# Patient Record
Sex: MALE | Race: AMERICAN INDIAN OR ALASKA NATIVE | NOT HISPANIC OR LATINO | Employment: FULL TIME | URBAN - METROPOLITAN AREA
[De-identification: names, ages, dates, MRNs, and addresses within clinical notes are randomized per-mention and may not be internally consistent; named-entity substitution may affect disease eponyms.]

---

## 2023-03-15 ENCOUNTER — OFFICE VISIT (OUTPATIENT)
Dept: NEPHROLOGY | Facility: MEDICAL CENTER | Age: 58
End: 2023-03-15
Payer: COMMERCIAL

## 2023-03-15 VITALS
OXYGEN SATURATION: 99 % | DIASTOLIC BLOOD PRESSURE: 70 MMHG | BODY MASS INDEX: 29.66 KG/M2 | WEIGHT: 178 LBS | HEART RATE: 72 BPM | HEIGHT: 65 IN | SYSTOLIC BLOOD PRESSURE: 122 MMHG | TEMPERATURE: 98.7 F

## 2023-03-15 DIAGNOSIS — N18.30 TYPE 2 DIABETES MELLITUS WITH STAGE 3 CHRONIC KIDNEY DISEASE, WITH LONG-TERM CURRENT USE OF INSULIN, UNSPECIFIED WHETHER STAGE 3A OR 3B CKD (HCC): ICD-10-CM

## 2023-03-15 DIAGNOSIS — Z79.4 TYPE 2 DIABETES MELLITUS WITH STAGE 3 CHRONIC KIDNEY DISEASE, WITH LONG-TERM CURRENT USE OF INSULIN, UNSPECIFIED WHETHER STAGE 3A OR 3B CKD (HCC): ICD-10-CM

## 2023-03-15 DIAGNOSIS — I10 PRIMARY HYPERTENSION: ICD-10-CM

## 2023-03-15 DIAGNOSIS — E11.22 TYPE 2 DIABETES MELLITUS WITH STAGE 3 CHRONIC KIDNEY DISEASE, WITH LONG-TERM CURRENT USE OF INSULIN, UNSPECIFIED WHETHER STAGE 3A OR 3B CKD (HCC): ICD-10-CM

## 2023-03-15 DIAGNOSIS — N18.30 STAGE 3 CHRONIC KIDNEY DISEASE, UNSPECIFIED WHETHER STAGE 3A OR 3B CKD: ICD-10-CM

## 2023-03-15 PROCEDURE — 99204 OFFICE O/P NEW MOD 45 MIN: CPT | Performed by: INTERNAL MEDICINE

## 2023-03-15 RX ORDER — BACLOFEN 10 MG/1
10 TABLET ORAL 3 TIMES DAILY
COMMUNITY

## 2023-03-15 RX ORDER — LISINOPRIL 20 MG/1
40 TABLET ORAL DAILY
COMMUNITY

## 2023-03-15 RX ORDER — HYDROCHLOROTHIAZIDE 25 MG/1
25 TABLET ORAL DAILY
COMMUNITY

## 2023-03-15 RX ORDER — INSULIN GLARGINE 100 [IU]/ML
20 INJECTION, SOLUTION SUBCUTANEOUS EVERY EVENING
COMMUNITY

## 2023-03-15 RX ORDER — CHOLECALCIFEROL (VITAMIN D3) 125 MCG
500 CAPSULE ORAL DAILY
COMMUNITY

## 2023-03-15 RX ORDER — ATORVASTATIN CALCIUM 10 MG/1
10 TABLET, FILM COATED ORAL NIGHTLY
COMMUNITY

## 2023-03-15 RX ORDER — GLIMEPIRIDE 2 MG/1
2 TABLET ORAL EVERY MORNING
COMMUNITY

## 2023-03-15 RX ORDER — PIOGLITAZONEHYDROCHLORIDE 15 MG/1
15 TABLET ORAL DAILY
COMMUNITY

## 2023-03-15 RX ORDER — TERBINAFINE HYDROCHLORIDE 250 MG/1
250 TABLET ORAL DAILY
COMMUNITY

## 2023-03-15 ASSESSMENT — ENCOUNTER SYMPTOMS
SHORTNESS OF BREATH: 0
CHILLS: 0
NAUSEA: 0
FEVER: 0
HYPERTENSION: 1
VOMITING: 0
COUGH: 0

## 2023-03-15 NOTE — PROGRESS NOTES
"Subjective     Juan Almaguer is a 57 y.o. male who presents with Chronic Kidney Disease and Hypertension            Patient has a long history of diabetes, chronic kidney disease with recent creatinine from December 2022 at 1.5 mg/dL.    Chronic Kidney Disease  This is a chronic problem. The current episode started more than 1 year ago. The problem occurs constantly. The problem has been gradually worsening. Pertinent negatives include no chest pain, chills, coughing, fever, nausea, urinary symptoms or vomiting.   Hypertension  This is a chronic problem. The current episode started more than 1 year ago. The problem is unchanged. The problem is controlled. Pertinent negatives include no chest pain, malaise/fatigue, peripheral edema or shortness of breath. Risk factors for coronary artery disease include diabetes mellitus and male gender. Past treatments include ACE inhibitors and diuretics. The current treatment provides significant improvement. There are no compliance problems.  Hypertensive end-organ damage includes kidney disease. Identifiable causes of hypertension include chronic renal disease.     Review of Systems   Constitutional:  Negative for chills, fever and malaise/fatigue.   Respiratory:  Negative for cough and shortness of breath.    Cardiovascular:  Negative for chest pain and leg swelling.   Gastrointestinal:  Negative for nausea and vomiting.   Genitourinary:  Negative for dysuria, frequency and urgency.   All other systems reviewed and are negative.           Objective     /70 (BP Location: Right arm, Patient Position: Sitting, BP Cuff Size: Adult)   Pulse 72   Temp 37.1 °C (98.7 °F) (Temporal)   Ht 1.651 m (5' 5\")   Wt 80.7 kg (178 lb)   SpO2 99%   BMI 29.62 kg/m²      Physical Exam  Vitals and nursing note reviewed.   Constitutional:       General: He is awake.      Appearance: He is not ill-appearing.   HENT:      Head: Normocephalic and atraumatic.      Right Ear: External " ear normal.      Left Ear: External ear normal.      Nose: Nose normal.      Mouth/Throat:      Pharynx: No oropharyngeal exudate or posterior oropharyngeal erythema.   Eyes:      General:         Right eye: No discharge.         Left eye: No discharge.      Conjunctiva/sclera: Conjunctivae normal.   Cardiovascular:      Rate and Rhythm: Normal rate and regular rhythm.   Pulmonary:      Effort: Pulmonary effort is normal. No respiratory distress.      Breath sounds: Normal breath sounds. No wheezing.   Abdominal:      General: Abdomen is flat. Bowel sounds are normal.   Musculoskeletal:         General: No tenderness.      Cervical back: No rigidity. No muscular tenderness.      Right lower leg: No edema.      Left lower leg: No edema.   Skin:     General: Skin is warm and dry.      Coloration: Skin is not jaundiced.      Findings: No lesion or rash.   Neurological:      General: No focal deficit present.      Mental Status: He is alert and oriented to person, place, and time. Mental status is at baseline.   Psychiatric:         Mood and Affect: Mood normal.         Behavior: Behavior normal.         Thought Content: Thought content normal.     Recent lab from December 2022 reviewed showed creatinine 1.5 mg/dL                      Assessment & Plan        1. Primary hypertension  Controlled  Continue same medication regimen  Continue low-sodium diet      2. Stage 3 chronic kidney disease, unspecified whether stage 3a or 3b CKD (HCC)  Etiology is most likely diabetic nephropathy recheck labs  No uremic symptoms  Renal dose of medication  Avoid nephrotoxins  Continue same medication regimen  Patient was advised to call us if symptoms worsen      3. Type 2 diabetes mellitus with stage 3 chronic kidney disease, with long-term current use of insulin, unspecified whether stage 3a or 3b CKD (HCC)  Continue to optimize diabetes control

## 2023-05-09 ENCOUNTER — HOSPITAL ENCOUNTER (EMERGENCY)
Facility: MEDICAL CENTER | Age: 58
End: 2023-05-09
Attending: EMERGENCY MEDICINE
Payer: COMMERCIAL

## 2023-05-09 VITALS
TEMPERATURE: 97.5 F | SYSTOLIC BLOOD PRESSURE: 151 MMHG | OXYGEN SATURATION: 96 % | RESPIRATION RATE: 14 BRPM | WEIGHT: 176.81 LBS | DIASTOLIC BLOOD PRESSURE: 71 MMHG | HEART RATE: 65 BPM | BODY MASS INDEX: 29.46 KG/M2 | HEIGHT: 65 IN

## 2023-05-09 DIAGNOSIS — E11.65 POORLY CONTROLLED TYPE 2 DIABETES MELLITUS (HCC): ICD-10-CM

## 2023-05-09 LAB
ALBUMIN SERPL BCP-MCNC: 3.8 G/DL (ref 3.2–4.9)
ALBUMIN/GLOB SERPL: 1.1 G/DL
ALP SERPL-CCNC: 112 U/L (ref 30–99)
ALT SERPL-CCNC: 18 U/L (ref 2–50)
ANION GAP SERPL CALC-SCNC: 12 MMOL/L (ref 7–16)
AST SERPL-CCNC: 24 U/L (ref 12–45)
BASOPHILS # BLD AUTO: 0.8 % (ref 0–1.8)
BASOPHILS # BLD: 0.04 K/UL (ref 0–0.12)
BILIRUB SERPL-MCNC: 0.5 MG/DL (ref 0.1–1.5)
BUN SERPL-MCNC: 26 MG/DL (ref 8–22)
CALCIUM ALBUM COR SERPL-MCNC: 8.9 MG/DL (ref 8.5–10.5)
CALCIUM SERPL-MCNC: 8.7 MG/DL (ref 8.5–10.5)
CHLORIDE SERPL-SCNC: 104 MMOL/L (ref 96–112)
CO2 SERPL-SCNC: 21 MMOL/L (ref 20–33)
CREAT SERPL-MCNC: 1.5 MG/DL (ref 0.5–1.4)
EOSINOPHIL # BLD AUTO: 0.06 K/UL (ref 0–0.51)
EOSINOPHIL NFR BLD: 1.1 % (ref 0–6.9)
ERYTHROCYTE [DISTWIDTH] IN BLOOD BY AUTOMATED COUNT: 41.8 FL (ref 35.9–50)
GFR SERPLBLD CREATININE-BSD FMLA CKD-EPI: 53 ML/MIN/1.73 M 2
GLOBULIN SER CALC-MCNC: 3.4 G/DL (ref 1.9–3.5)
GLUCOSE BLD STRIP.AUTO-MCNC: 249 MG/DL (ref 65–99)
GLUCOSE SERPL-MCNC: 343 MG/DL (ref 65–99)
HCT VFR BLD AUTO: 39.6 % (ref 42–52)
HGB BLD-MCNC: 13.8 G/DL (ref 14–18)
IMM GRANULOCYTES # BLD AUTO: 0.03 K/UL (ref 0–0.11)
IMM GRANULOCYTES NFR BLD AUTO: 0.6 % (ref 0–0.9)
LYMPHOCYTES # BLD AUTO: 1.36 K/UL (ref 1–4.8)
LYMPHOCYTES NFR BLD: 26.1 % (ref 22–41)
MCH RBC QN AUTO: 30.1 PG (ref 27–33)
MCHC RBC AUTO-ENTMCNC: 34.8 G/DL (ref 33.7–35.3)
MCV RBC AUTO: 86.3 FL (ref 81.4–97.8)
MONOCYTES # BLD AUTO: 0.51 K/UL (ref 0–0.85)
MONOCYTES NFR BLD AUTO: 9.8 % (ref 0–13.4)
NEUTROPHILS # BLD AUTO: 3.22 K/UL (ref 1.82–7.42)
NEUTROPHILS NFR BLD: 61.6 % (ref 44–72)
NRBC # BLD AUTO: 0 K/UL
NRBC BLD-RTO: 0 /100 WBC
PLATELET # BLD AUTO: 291 K/UL (ref 164–446)
PMV BLD AUTO: 10.9 FL (ref 9–12.9)
POTASSIUM SERPL-SCNC: 4.8 MMOL/L (ref 3.6–5.5)
PROT SERPL-MCNC: 7.2 G/DL (ref 6–8.2)
RBC # BLD AUTO: 4.59 M/UL (ref 4.7–6.1)
SODIUM SERPL-SCNC: 137 MMOL/L (ref 135–145)
WBC # BLD AUTO: 5.2 K/UL (ref 4.8–10.8)

## 2023-05-09 PROCEDURE — 99284 EMERGENCY DEPT VISIT MOD MDM: CPT

## 2023-05-09 PROCEDURE — 80053 COMPREHEN METABOLIC PANEL: CPT

## 2023-05-09 PROCEDURE — 82962 GLUCOSE BLOOD TEST: CPT

## 2023-05-09 PROCEDURE — 700105 HCHG RX REV CODE 258: Performed by: EMERGENCY MEDICINE

## 2023-05-09 PROCEDURE — 36415 COLL VENOUS BLD VENIPUNCTURE: CPT

## 2023-05-09 PROCEDURE — 85025 COMPLETE CBC W/AUTO DIFF WBC: CPT

## 2023-05-09 RX ORDER — SODIUM CHLORIDE, SODIUM LACTATE, POTASSIUM CHLORIDE, CALCIUM CHLORIDE 600; 310; 30; 20 MG/100ML; MG/100ML; MG/100ML; MG/100ML
1000 INJECTION, SOLUTION INTRAVENOUS ONCE
Status: COMPLETED | OUTPATIENT
Start: 2023-05-09 | End: 2023-05-09

## 2023-05-09 RX ADMIN — SODIUM CHLORIDE, POTASSIUM CHLORIDE, SODIUM LACTATE AND CALCIUM CHLORIDE 1000 ML: 600; 310; 30; 20 INJECTION, SOLUTION INTRAVENOUS at 08:28

## 2023-05-09 NOTE — ED TRIAGE NOTES
"Chief Complaint   Patient presents with    High Blood Sugar     Pt states that for the last couple of weeks his head and neck have become numb intermittently. Pt also complains of blurry vision and headaches. Pt states he has a history of diabetes and HTN. Pt states that his blood glucose was 340 this morning. Pt states that he takes Lantus daily.      BP (!) 179/83   Pulse 86   Temp 36.3 °C (97.3 °F) (Temporal)   Resp 14   Ht 1.651 m (5' 5\")   Wt 80.2 kg (176 lb 12.9 oz)   SpO2 96%   BMI 29.42 kg/m²     Pt is ambulatory in and out of triage. Appropriate PPE worn throughout entire encounter. Pt placed back in the lobby and educated about triage process.    "

## 2023-05-09 NOTE — ED NOTES
Pt given discharge instructions/home care instructions explained, pt verbalized understanding of instructions given/pt understands the importance of follow up, pt ambulatory to ER rena.

## 2023-05-09 NOTE — ED PROVIDER NOTES
ED Provider Note    CHIEF COMPLAINT  Chief Complaint   Patient presents with    High Blood Sugar     Pt states that for the last couple of weeks his head and neck have become numb intermittently. Pt also complains of blurry vision and headaches. Pt states he has a history of diabetes and HTN. Pt states that his blood glucose was 340 this morning. Pt states that he takes Lantus daily.        EXTERNAL RECORDS REVIEWED  Outpatient Notes seen by nephrology in March 2023 for first visit.  Thought to have diabetic nephropathy.  Creatinine of 1.5 as recently as December 2022 no other past medical history for review.    HPI/ROS  LIMITATION TO HISTORY   Select: : None  OUTSIDE HISTORIAN(S):       Juan Almaguer is a 57 y.o. male who presents with numbness to his face and intermittent headaches.  He states that his numbness is throughout his head.  Does not have a significant headache at this time however.  Chief complaint is numbness however.  Has been ongoing for the past few weeks.    Patient has a known history of diabetes and hypertension.  He states that he takes Lantus nightly along with 2 oral agents for diabetes management.  He is also status post take a weekly injection, I suspect Trulicity, though states that he has having difficulty remembering to take this so he has stopped over the past year or so.  He last saw his primary care provider at the [facility] about a month and a half ago.    Blood sugar was 150 last night.  States that he woke up and it was in the 300s.  He is having difficulty maintaining euglycemia.    He states that he has had polyuria though denies polydipsia.  Feels like he has a dry mouth.  He also has a known history of peripheral neuropathy to his hands and feet.    No weakness to his arms or legs.  No difficulty with speaking.  No history of trauma.  No new medications.    PAST MEDICAL HISTORY       SURGICAL HISTORY  patient denies any surgical history    FAMILY  "HISTORY  History reviewed. No pertinent family history.    SOCIAL HISTORY  Social History     Tobacco Use    Smoking status: Never    Smokeless tobacco: Never   Vaping Use    Vaping Use: Never used   Substance and Sexual Activity    Alcohol use: Not Currently    Drug use: Not Currently    Sexual activity: Not on file       CURRENT MEDICATIONS  Home Medications       Reviewed by Cristy Gill R.N. (Registered Nurse) on 05/09/23 at 0701  Med List Status: Not Addressed     Medication Last Dose Status   atorvastatin (LIPITOR) 10 MG Tab  Active   baclofen (LIORESAL) 10 MG Tab  Active   cyanocobalamin (VITAMIN B-12) 500 MCG Tab  Active   glimepiride (AMARYL) 2 MG Tab  Active   hydroCHLOROthiazide (HYDRODIURIL) 25 MG Tab  Active   insulin glargine (LANTUS) 100 UNIT/ML SC SOLN  Active   lisinopril (PRINIVIL) 20 MG Tab  Active   pioglitazone (ACTOS) 15 MG Tab  Active   terbinafine (LAMISIL) 250 MG Tab  Active                    ALLERGIES  Allergies   Allergen Reactions    Penicillins Itching       PHYSICAL EXAM  VITAL SIGNS: BP (!) 179/83   Pulse 86   Temp 36.3 °C (97.3 °F) (Temporal)   Resp 14   Ht 1.651 m (5' 5\")   Wt 80.2 kg (176 lb 12.9 oz)   SpO2 96%   BMI 29.42 kg/m²    Constitutional: Alert in no apparent distress.  HENT: No signs of trauma, Bilateral external ears normal, Nose normal.   Eyes: Pupils are equal and reactive, Conjunctiva normal, Non-icteric.   Neck: Normal range of motion, No tenderness, Supple, No stridor.   Cardiovascular: Regular rate and rhythm.   Thorax & Lungs: Normal breath sounds, No respiratory distress, No wheezing  Abdomen: Soft, No tenderness, No peritoneal signs, No masses.   Skin: Warm, Dry, No erythema, No rash.   Back: No bony tenderness, No CVA tenderness.   Extremities: Intact distal pulses, No edema, No tenderness, No cyanosis  Musculoskeletal: Good range of motion in all major joints. No major deformities noted.   Neurologic: Alert, Normal motor function, Normal sensory " function, No focal deficits noted.   Psychiatric: Affect normal, Judgment normal, Mood normal.       DIAGNOSTIC STUDIES / PROCEDURES  EKG  I have independently interpreted this EKG  No results found for this or any previous visit.    LABS  Labs Reviewed   CBC WITH DIFFERENTIAL - Abnormal; Notable for the following components:       Result Value    RBC 4.59 (*)     Hemoglobin 13.8 (*)     Hematocrit 39.6 (*)     All other components within normal limits   COMP METABOLIC PANEL - Abnormal; Notable for the following components:    Glucose 343 (*)     Bun 26 (*)     Creatinine 1.50 (*)     Alkaline Phosphatase 112 (*)     All other components within normal limits   ESTIMATED GFR - Abnormal; Notable for the following components:    GFR (CKD-EPI) 53 (*)     All other components within normal limits   CORRECTED CALCIUM       COURSE & MEDICAL DECISION MAKING    ED Observation Status? No; Patient does not meet criteria for ED Observation.     INITIAL ASSESSMENT, COURSE AND PLAN  Care Narrative: 57 y.o. M with a known history of diabetes presenting with numbness to his face.  Has longstanding prior numbness to bilateral upper and lower extremities consistent with neuropathy.  Denies any difficulty with speech, vision, strength in his upper or lower extremities.  It appears to involve his entire face.  It comes and goes.  Also has some occasional blurry vision and headaches.  He has had his sugars have been running a little high recently in the 300s.  He has not been compliant with his weekly injection dosing of diabetes medication.  I suspect it may be Trulicity.    Upon review of patient's primary record, his baseline creatinine is 1.5.  Consistent with today's creatinine.  Glucose is elevated at 343.  No evidence of DKA.  Patient was given IV fluid hydration for hyperglycemia.  Upon reevaluation, he is resting comfortably.  States that he feels better overall.  Given the distribution of the patient's numbness involving his  entire face and only intermittently, does not appear to be consistent with strokelike symptoms.  No motor deficits.  No difficulty with speech or vision.  No facial droop.    Symptoms are most consistent with a diabetic neuropathy.  After IV hydration, hypercapnia did improve.  Recommend follow-up with primary care for further management and for stricter glucose management long-term.  Importance of long-term management and potential for very undesirable sequelae of uncontrolled diabetes discussed with patient.    HYDRATION: Based on the patient's presentation of Hyperglycemia the patient was given IV fluids. IV Hydration was used because oral hydration was not as rapid as required. Upon recheck following hydration, the patient was improved.  HTN/IDDM FOLLOW UP:  The patient is referred to a primary physician for blood pressure management, diabetic screening, and for all other preventive health concerns      ADDITIONAL PROBLEM LIST  Medication noncompliance    DISPOSITION AND DISCUSSIONS  I have discussed management of the patient with the following physicians and SID's:      Discussion of management with other QHP or appropriate source(s): None     Escalation of care considered, and ultimately not performed:IV fluids and diagnostic imaging    Barriers to care at this time, including but not limited to:    .     Decision tools and prescription drugs considered including, but not limited to:    .    FINAL DIAGNOSIS  1. Poorly controlled type 2 diabetes mellitus (HCC)           Electronically signed by: Fidel Lewis M.D., 5/9/2023 7:59 AM

## 2023-06-27 ENCOUNTER — TELEPHONE (OUTPATIENT)
Dept: HEALTH INFORMATION MANAGEMENT | Facility: OTHER | Age: 58
End: 2023-06-27
Payer: COMMERCIAL

## 2023-08-09 ENCOUNTER — TELEPHONE (OUTPATIENT)
Dept: CARDIOLOGY | Facility: MEDICAL CENTER | Age: 58
End: 2023-08-09
Payer: COMMERCIAL

## 2023-08-09 NOTE — TELEPHONE ENCOUNTER
Called patient in regards to records for NP appointment with Dr. Borrero To review most recent lab results, ekg, any cardiac testing or ,if patient has been treated by a cardiologist. Bad phone number, unable to LVM.

## 2023-08-30 ENCOUNTER — OFFICE VISIT (OUTPATIENT)
Dept: CARDIOLOGY | Facility: MEDICAL CENTER | Age: 58
End: 2023-08-30
Attending: INTERNAL MEDICINE
Payer: COMMERCIAL

## 2023-08-30 VITALS
WEIGHT: 184 LBS | OXYGEN SATURATION: 95 % | HEIGHT: 65 IN | SYSTOLIC BLOOD PRESSURE: 164 MMHG | DIASTOLIC BLOOD PRESSURE: 88 MMHG | RESPIRATION RATE: 17 BRPM | BODY MASS INDEX: 30.66 KG/M2 | HEART RATE: 79 BPM

## 2023-08-30 DIAGNOSIS — R06.09 DYSPNEA ON EXERTION: ICD-10-CM

## 2023-08-30 DIAGNOSIS — I10 HTN (HYPERTENSION), MALIGNANT: ICD-10-CM

## 2023-08-30 DIAGNOSIS — E78.2 MIXED HYPERLIPIDEMIA: ICD-10-CM

## 2023-08-30 DIAGNOSIS — R94.31 NONSPECIFIC ABNORMAL ELECTROCARDIOGRAM (ECG) (EKG): ICD-10-CM

## 2023-08-30 LAB — EKG IMPRESSION: NORMAL

## 2023-08-30 PROCEDURE — 99213 OFFICE O/P EST LOW 20 MIN: CPT | Performed by: INTERNAL MEDICINE

## 2023-08-30 PROCEDURE — 99212 OFFICE O/P EST SF 10 MIN: CPT | Performed by: INTERNAL MEDICINE

## 2023-08-30 PROCEDURE — 3079F DIAST BP 80-89 MM HG: CPT | Performed by: INTERNAL MEDICINE

## 2023-08-30 PROCEDURE — 93010 ELECTROCARDIOGRAM REPORT: CPT | Performed by: INTERNAL MEDICINE

## 2023-08-30 PROCEDURE — 93005 ELECTROCARDIOGRAM TRACING: CPT | Performed by: INTERNAL MEDICINE

## 2023-08-30 PROCEDURE — 99204 OFFICE O/P NEW MOD 45 MIN: CPT | Mod: 25 | Performed by: INTERNAL MEDICINE

## 2023-08-30 PROCEDURE — 3077F SYST BP >= 140 MM HG: CPT | Performed by: INTERNAL MEDICINE

## 2023-08-30 RX ORDER — AMLODIPINE BESYLATE 5 MG/1
5 TABLET ORAL DAILY
Qty: 100 TABLET | Refills: 4 | Status: SHIPPED | OUTPATIENT
Start: 2023-08-30

## 2023-08-30 ASSESSMENT — ENCOUNTER SYMPTOMS
CLAUDICATION: 0
FEVER: 0
PND: 0
BLURRED VISION: 0
MYALGIAS: 0
WEIGHT LOSS: 0
ORTHOPNEA: 0
SHORTNESS OF BREATH: 0
SPEECH CHANGE: 0
NAUSEA: 0
VOMITING: 0
EYE PAIN: 0
HEADACHES: 0
DIZZINESS: 0
FALLS: 0
BRUISES/BLEEDS EASILY: 0
EYE DISCHARGE: 0
SENSORY CHANGE: 0
BLOOD IN STOOL: 0
ABDOMINAL PAIN: 0
CHILLS: 0
DEPRESSION: 0
LOSS OF CONSCIOUSNESS: 0
COUGH: 0
PALPITATIONS: 0
DOUBLE VISION: 0
HALLUCINATIONS: 0

## 2023-08-30 ASSESSMENT — FIBROSIS 4 INDEX: FIB4 SCORE: 1.17

## 2023-08-30 NOTE — PROGRESS NOTES
No chief complaint on file.      Subjective     Juan Almaguer is a 58 y.o. male who presents today for feeling winded upon walking up inclines or for distance. No symptoms at rest or with daily living activities.    I have personally interpreted EKG today with patient, there is no evidence of acute coronary syndrome, no evidence of prior infarct, normal CO and QT interval, no significant conduction disease. Sinus rhythm. Signs of LVH.    Juan Almaguer does not have any history of heart attack arrhythmias in the past. he never had transthoracic echocardiogram, cardiac catheterization or ablations procedure in the past.       History reviewed. No pertinent past medical history.  History reviewed. No pertinent surgical history.  History reviewed. No pertinent family history.  Social History     Socioeconomic History    Marital status: Single     Spouse name: Not on file    Number of children: Not on file    Years of education: Not on file    Highest education level: Not on file   Occupational History    Not on file   Tobacco Use    Smoking status: Never    Smokeless tobacco: Never   Vaping Use    Vaping Use: Never used   Substance and Sexual Activity    Alcohol use: Not Currently    Drug use: Not Currently    Sexual activity: Not on file   Other Topics Concern    Not on file   Social History Narrative    Not on file     Social Determinants of Health     Financial Resource Strain: Not on file   Food Insecurity: Not on file   Transportation Needs: Not on file   Physical Activity: Not on file   Stress: Not on file   Social Connections: Not on file   Intimate Partner Violence: Not on file   Housing Stability: Not on file     Allergies   Allergen Reactions    Penicillins Itching     Outpatient Encounter Medications as of 8/30/2023   Medication Sig Dispense Refill    amLODIPine (NORVASC) 5 MG Tab Take 1 Tablet by mouth every day. 100 Tablet 4    hydroCHLOROthiazide (HYDRODIURIL) 25 MG Tab Take 25 mg by  "mouth every day.      glimepiride (AMARYL) 2 MG Tab Take 2 mg by mouth every morning.      lisinopril (PRINIVIL) 20 MG Tab Take 40 mg by mouth every day.      pioglitazone (ACTOS) 15 MG Tab Take 15 mg by mouth every day.      insulin glargine (LANTUS) 100 UNIT/ML SC SOLN Inject 20 Units under the skin every evening.      atorvastatin (LIPITOR) 10 MG Tab Take 10 mg by mouth every evening.      baclofen (LIORESAL) 10 MG Tab Take 10 mg by mouth 3 times a day.      terbinafine (LAMISIL) 250 MG Tab Take 250 mg by mouth every day.      cyanocobalamin (VITAMIN B-12) 500 MCG Tab Take 500 mcg by mouth every day.       No facility-administered encounter medications on file as of 8/30/2023.     Review of Systems   Constitutional:  Positive for malaise/fatigue. Negative for chills, fever and weight loss.   HENT:  Negative for ear discharge, ear pain, hearing loss and nosebleeds.    Eyes:  Negative for blurred vision, double vision, pain and discharge.   Respiratory:  Negative for cough and shortness of breath.    Cardiovascular:  Negative for chest pain, palpitations, orthopnea, claudication, leg swelling and PND.   Gastrointestinal:  Negative for abdominal pain, blood in stool, melena, nausea and vomiting.   Genitourinary:  Negative for dysuria and hematuria.   Musculoskeletal:  Negative for falls, joint pain and myalgias.   Skin:  Negative for itching and rash.   Neurological:  Negative for dizziness, sensory change, speech change, loss of consciousness and headaches.   Endo/Heme/Allergies:  Negative for environmental allergies. Does not bruise/bleed easily.   Psychiatric/Behavioral:  Negative for depression, hallucinations and suicidal ideas.               Objective     BP (!) 164/88 (BP Location: Left arm, Patient Position: Sitting, BP Cuff Size: Adult)   Pulse 79   Resp 17   Ht 1.651 m (5' 5\")   Wt 83.5 kg (184 lb)   SpO2 95%   BMI 30.62 kg/m²     Physical Exam  Vitals and nursing note reviewed.   Constitutional:     "   General: He is not in acute distress.     Appearance: He is not diaphoretic.   HENT:      Head: Normocephalic and atraumatic.      Right Ear: External ear normal.      Left Ear: External ear normal.      Nose: No congestion or rhinorrhea.   Eyes:      General:         Right eye: No discharge.         Left eye: No discharge.   Neck:      Thyroid: No thyromegaly.      Vascular: No JVD.   Cardiovascular:      Rate and Rhythm: Normal rate and regular rhythm.      Pulses: Normal pulses.   Pulmonary:      Effort: No respiratory distress.   Abdominal:      General: There is no distension.      Tenderness: There is no abdominal tenderness.   Musculoskeletal:         General: No swelling or tenderness.      Right lower leg: No edema.      Left lower leg: No edema.   Skin:     General: Skin is warm and dry.   Neurological:      Mental Status: He is alert and oriented to person, place, and time.      Cranial Nerves: No cranial nerve deficit.   Psychiatric:         Behavior: Behavior normal.                Assessment & Plan     1. Mixed hyperlipidemia  EKG    EC-ECHOCARDIOGRAM COMPLETE W/O CONT    Treadmill Stress    Basic Metabolic Panel    LIPID PANEL    Lipoprotein (a)      2. HTN (hypertension), malignant  EC-ECHOCARDIOGRAM COMPLETE W/O CONT    Treadmill Stress    amLODIPine (NORVASC) 5 MG Tab      3. Dyspnea on exertion  EC-ECHOCARDIOGRAM COMPLETE W/O CONT    Treadmill Stress      4. Nonspecific abnormal electrocardiogram (ECG) (EKG)  EC-ECHOCARDIOGRAM COMPLETE W/O CONT    Treadmill Stress          Medical Decision Making: Today's Assessment/Status/Plan:   At this time, to further risk stratify, I will order a transthoracic echocardiogram to assess cardiac and valvular functions. I will also order a treadmill stress test (to avoid radiation exposure in young patients) to assess for coronary ischemia.    Blood pressure is high. Will add Amlodipine 5 mg daily. Continue Lisinopril 40 mg daily.    Continue Atorvastatin 10 mg  papito.    Twan Arias M.D.

## 2023-09-05 ENCOUNTER — HOSPITAL ENCOUNTER (OUTPATIENT)
Dept: CARDIOLOGY | Facility: MEDICAL CENTER | Age: 58
End: 2023-09-05
Attending: INTERNAL MEDICINE
Payer: COMMERCIAL

## 2023-09-19 ENCOUNTER — HOSPITAL ENCOUNTER (OUTPATIENT)
Dept: CARDIOLOGY | Facility: MEDICAL CENTER | Age: 58
End: 2023-09-19
Attending: INTERNAL MEDICINE
Payer: COMMERCIAL

## 2023-09-19 DIAGNOSIS — I10 HTN (HYPERTENSION), MALIGNANT: ICD-10-CM

## 2023-09-19 DIAGNOSIS — R06.09 DYSPNEA ON EXERTION: ICD-10-CM

## 2023-09-19 DIAGNOSIS — E78.2 MIXED HYPERLIPIDEMIA: ICD-10-CM

## 2023-09-19 DIAGNOSIS — R94.31 NONSPECIFIC ABNORMAL ELECTROCARDIOGRAM (ECG) (EKG): ICD-10-CM

## 2023-09-19 LAB
LV EJECT FRACT  99904: 55
LV EJECT FRACT MOD 2C 99903: 57.28
LV EJECT FRACT MOD 4C 99902: 51.23
LV EJECT FRACT MOD BP 99901: 52.8

## 2023-09-19 PROCEDURE — 93306 TTE W/DOPPLER COMPLETE: CPT

## 2023-09-19 PROCEDURE — 93306 TTE W/DOPPLER COMPLETE: CPT | Mod: 26 | Performed by: INTERNAL MEDICINE

## 2024-02-08 ENCOUNTER — APPOINTMENT (OUTPATIENT)
Dept: RADIOLOGY | Facility: MEDICAL CENTER | Age: 59
End: 2024-02-08
Attending: INTERNAL MEDICINE
Payer: COMMERCIAL

## 2024-11-25 ENCOUNTER — TELEPHONE (OUTPATIENT)
Dept: NEPHROLOGY | Facility: MEDICAL CENTER | Age: 59
End: 2024-11-25
Payer: COMMERCIAL

## 2024-11-26 DIAGNOSIS — N18.30 STAGE 3 CHRONIC KIDNEY DISEASE, UNSPECIFIED WHETHER STAGE 3A OR 3B CKD: ICD-10-CM

## 2024-12-04 ENCOUNTER — APPOINTMENT (OUTPATIENT)
Dept: NEPHROLOGY | Facility: MEDICAL CENTER | Age: 59
End: 2024-12-04
Payer: COMMERCIAL

## 2025-01-29 ENCOUNTER — OFFICE VISIT (OUTPATIENT)
Dept: NEPHROLOGY | Facility: MEDICAL CENTER | Age: 60
End: 2025-01-29
Payer: COMMERCIAL

## 2025-01-29 VITALS
DIASTOLIC BLOOD PRESSURE: 82 MMHG | TEMPERATURE: 98.2 F | HEART RATE: 89 BPM | OXYGEN SATURATION: 98 % | HEIGHT: 65 IN | SYSTOLIC BLOOD PRESSURE: 126 MMHG | WEIGHT: 190 LBS | BODY MASS INDEX: 31.65 KG/M2

## 2025-01-29 DIAGNOSIS — R80.9 PROTEINURIA, UNSPECIFIED TYPE: ICD-10-CM

## 2025-01-29 DIAGNOSIS — I10 PRIMARY HYPERTENSION: ICD-10-CM

## 2025-01-29 DIAGNOSIS — E11.22 TYPE 2 DIABETES MELLITUS WITH STAGE 3B CHRONIC KIDNEY DISEASE, WITH LONG-TERM CURRENT USE OF INSULIN (HCC): ICD-10-CM

## 2025-01-29 DIAGNOSIS — N18.32 STAGE 3B CHRONIC KIDNEY DISEASE: ICD-10-CM

## 2025-01-29 DIAGNOSIS — E87.5 HYPERKALEMIA: ICD-10-CM

## 2025-01-29 DIAGNOSIS — N18.32 TYPE 2 DIABETES MELLITUS WITH STAGE 3B CHRONIC KIDNEY DISEASE, WITH LONG-TERM CURRENT USE OF INSULIN (HCC): ICD-10-CM

## 2025-01-29 DIAGNOSIS — Z79.4 TYPE 2 DIABETES MELLITUS WITH STAGE 3B CHRONIC KIDNEY DISEASE, WITH LONG-TERM CURRENT USE OF INSULIN (HCC): ICD-10-CM

## 2025-01-29 RX ORDER — EMPAGLIFLOZIN 10 MG/1
10 TABLET, FILM COATED ORAL DAILY
Qty: 100 TABLET | Refills: 3 | Status: SHIPPED | OUTPATIENT
Start: 2025-01-29 | End: 2026-03-05

## 2025-01-29 ASSESSMENT — ENCOUNTER SYMPTOMS
VOMITING: 0
NAUSEA: 0
COUGH: 0
HYPERTENSION: 1
CHILLS: 0
FEVER: 0
SHORTNESS OF BREATH: 0

## 2025-01-29 ASSESSMENT — FIBROSIS 4 INDEX: FIB4 SCORE: 1.19

## 2025-01-29 NOTE — PROGRESS NOTES
"Subjective     Juan Almaguer is a 59 y.o. male who presents with Hypertension and Chronic Kidney Disease            Hypertension  This is a chronic problem. The current episode started more than 1 year ago. The problem is unchanged. The problem is controlled. Pertinent negatives include no chest pain, malaise/fatigue, peripheral edema or shortness of breath. Risk factors for coronary artery disease include male gender and diabetes mellitus. Past treatments include calcium channel blockers, diuretics and ACE inhibitors. The current treatment provides significant improvement. There are no compliance problems.  Hypertensive end-organ damage includes kidney disease. Identifiable causes of hypertension include chronic renal disease.   Chronic Kidney Disease  This is a chronic problem. The current episode started more than 1 year ago. The problem occurs constantly. The problem has been gradually worsening. Pertinent negatives include no chest pain, chills, coughing, fever, nausea, urinary symptoms or vomiting.       Review of Systems   Constitutional:  Negative for chills, fever and malaise/fatigue.   Respiratory:  Negative for cough and shortness of breath.    Cardiovascular:  Negative for chest pain and leg swelling.   Gastrointestinal:  Negative for nausea and vomiting.   Genitourinary:  Negative for dysuria, frequency and urgency.              Objective     /82 (BP Location: Right arm, Patient Position: Sitting, BP Cuff Size: Adult)   Pulse 89   Temp 36.8 °C (98.2 °F) (Temporal)   Ht 1.651 m (5' 5\")   Wt 86.2 kg (190 lb)   SpO2 98%   BMI 31.62 kg/m²      Physical Exam  Vitals and nursing note reviewed.   Constitutional:       General: He is not in acute distress.     Appearance: He is not ill-appearing.   HENT:      Head: Normocephalic and atraumatic.      Right Ear: External ear normal.      Left Ear: External ear normal.      Nose: Nose normal.   Eyes:      General:         Right eye: No " "discharge.         Left eye: No discharge.      Conjunctiva/sclera: Conjunctivae normal.   Cardiovascular:      Rate and Rhythm: Normal rate and regular rhythm.      Heart sounds: No murmur heard.  Pulmonary:      Effort: Pulmonary effort is normal. No respiratory distress.      Breath sounds: Normal breath sounds. No wheezing.   Musculoskeletal:         General: No tenderness or deformity.      Right lower leg: No edema.      Left lower leg: No edema.   Skin:     General: Skin is warm.   Neurological:      General: No focal deficit present.      Mental Status: He is alert and oriented to person, place, and time.   Psychiatric:         Mood and Affect: Mood normal.         Behavior: Behavior normal.     History reviewed. No pertinent past medical history.  Social History     Socioeconomic History    Marital status: Single     Spouse name: Not on file    Number of children: Not on file    Years of education: Not on file    Highest education level: Not on file   Occupational History    Not on file   Tobacco Use    Smoking status: Never    Smokeless tobacco: Never   Vaping Use    Vaping status: Never Used   Substance and Sexual Activity    Alcohol use: Not Currently    Drug use: Not Currently    Sexual activity: Not on file   Other Topics Concern    Not on file   Social History Narrative    Not on file     Social Drivers of Health     Financial Resource Strain: Not on file   Food Insecurity: Not on file   Transportation Needs: Not on file   Physical Activity: Not on file   Stress: Not on file   Social Connections: Not on file   Intimate Partner Violence: Not on file   Housing Stability: Not on file     History reviewed. No pertinent family history.  No results for input(s): \"HGB\", \"ALBUMIN\", \"HDL\", \"TRIGLYCERIDE\", \"SODIUM\", \"POTASSIUM\", \"CHLORIDE\", \"CO2\", \"BUN\", \"CREATININE\", \"PHOSPHORUS\" in the last 8544 hours.    Invalid input(s): \"CHOLESTEROL\", \"LDL CLACULATED\", \"URIC ACID\", \"INTACT PTH\", \"PRO CREAT RATIO\"  Recent " labs from January 2025 for reviewed showed creatinine 1.8 mg/dL  Potassium 5.5                        Assessment & Plan        Assessment & Plan  Primary hypertension  Controlled  Continue same medication regimen  Continue low-sodium diet    Orders:    Basic Metabolic Panel; Future    CBC WITHOUT DIFFERENTIAL; Future    MICROALB/CREAT RATIO RAND. 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 UR    Basic Metabolic Panel; Future    Hyperkalemia  Patient was advised to be on low potassium diet  Recheck labs in 1 to 2 weeks  Orders:    Basic Metabolic Panel; Future    CBC WITHOUT DIFFERENTIAL; Future    MICROALB/CREAT RATIO RAND. UR    Type 2 diabetes mellitus with stage 3b chronic kidney disease, with long-term current use of insulin (HCC)  Continue to optimize diabetes control  Start SGLT2 inhibitor  Orders:    Basic Metabolic Panel; Future    CBC WITHOUT DIFFERENTIAL; Future    MICROALB/CREAT RATIO RAND. UR    Basic Metabolic Panel; Future    Proteinuria, unspecified type  Continue ACE inhibitor  Start SGLT2 inhibitor  Orders:    Basic Metabolic Panel; Future

## 2025-02-20 ENCOUNTER — HOSPITAL ENCOUNTER (OUTPATIENT)
Facility: MEDICAL CENTER | Age: 60
End: 2025-02-22
Attending: EMERGENCY MEDICINE | Admitting: HOSPITALIST
Payer: COMMERCIAL

## 2025-02-20 ENCOUNTER — APPOINTMENT (OUTPATIENT)
Dept: RADIOLOGY | Facility: MEDICAL CENTER | Age: 60
End: 2025-02-20
Attending: EMERGENCY MEDICINE
Payer: COMMERCIAL

## 2025-02-20 DIAGNOSIS — R74.8 ELEVATED LIPASE: ICD-10-CM

## 2025-02-20 DIAGNOSIS — N17.9 AKI (ACUTE KIDNEY INJURY) (HCC): ICD-10-CM

## 2025-02-20 DIAGNOSIS — I10 PRIMARY HYPERTENSION: ICD-10-CM

## 2025-02-20 DIAGNOSIS — E11.65 TYPE 2 DIABETES MELLITUS WITH HYPERGLYCEMIA, WITHOUT LONG-TERM CURRENT USE OF INSULIN (HCC): ICD-10-CM

## 2025-02-20 PROBLEM — E87.1 HYPONATREMIA: Status: ACTIVE | Noted: 2025-02-20

## 2025-02-20 PROBLEM — E78.2 MIXED HYPERLIPIDEMIA: Status: ACTIVE | Noted: 2025-02-20

## 2025-02-20 PROBLEM — E87.20 METABOLIC ACIDOSIS: Status: ACTIVE | Noted: 2025-02-20

## 2025-02-20 PROBLEM — E86.0 DEHYDRATION: Status: ACTIVE | Noted: 2025-02-20

## 2025-02-20 LAB
ADV 40+41 DNA STL QL NAA+NON-PROBE: NOT DETECTED
ALBUMIN SERPL BCP-MCNC: 4.2 G/DL (ref 3.2–4.9)
ALBUMIN/GLOB SERPL: 1.1 G/DL
ALP SERPL-CCNC: 95 U/L (ref 30–99)
ALT SERPL-CCNC: 33 U/L (ref 2–50)
ANION GAP SERPL CALC-SCNC: 13 MMOL/L (ref 7–16)
APPEARANCE UR: CLEAR
AST SERPL-CCNC: 32 U/L (ref 12–45)
ASTRO TYP 1-8 RNA STL QL NAA+NON-PROBE: NOT DETECTED
B-OH-BUTYR SERPL-MCNC: 0.11 MMOL/L (ref 0.02–0.27)
BACTERIA #/AREA URNS HPF: NORMAL /HPF
BASOPHILS # BLD AUTO: 0.4 % (ref 0–1.8)
BASOPHILS # BLD: 0.02 K/UL (ref 0–0.12)
BILIRUB SERPL-MCNC: 0.5 MG/DL (ref 0.1–1.5)
BILIRUB UR QL STRIP.AUTO: NEGATIVE
BUN SERPL-MCNC: 85 MG/DL (ref 8–22)
C CAYETANENSIS DNA STL QL NAA+NON-PROBE: NOT DETECTED
C COLI+JEJ+UPSA DNA STL QL NAA+NON-PROBE: NOT DETECTED
CALCIUM ALBUM COR SERPL-MCNC: 8.6 MG/DL (ref 8.5–10.5)
CALCIUM SERPL-MCNC: 8.8 MG/DL (ref 8.5–10.5)
CASTS URNS QL MICRO: NORMAL /LPF (ref 0–2)
CHLORIDE SERPL-SCNC: 103 MMOL/L (ref 96–112)
CO2 SERPL-SCNC: 17 MMOL/L (ref 20–33)
COLOR UR: YELLOW
CREAT SERPL-MCNC: 3.55 MG/DL (ref 0.5–1.4)
CRYPTOSP DNA STL QL NAA+NON-PROBE: NOT DETECTED
E HISTOLYT DNA STL QL NAA+NON-PROBE: NOT DETECTED
EAEC PAA PLAS AGGR+AATA ST NAA+NON-PRB: NOT DETECTED
EC STX1+STX2 GENES STL QL NAA+NON-PROBE: NOT DETECTED
EOSINOPHIL # BLD AUTO: 0.04 K/UL (ref 0–0.51)
EOSINOPHIL NFR BLD: 0.7 % (ref 0–6.9)
EPEC EAE GENE STL QL NAA+NON-PROBE: NOT DETECTED
EPITHELIAL CELLS 1715: NORMAL /HPF (ref 0–5)
ERYTHROCYTE [DISTWIDTH] IN BLOOD BY AUTOMATED COUNT: 42.5 FL (ref 35.9–50)
ETEC LTA+ST1A+ST1B TOX ST NAA+NON-PROBE: NOT DETECTED
G LAMBLIA DNA STL QL NAA+NON-PROBE: NOT DETECTED
GFR SERPLBLD CREATININE-BSD FMLA CKD-EPI: 19 ML/MIN/1.73 M 2
GLOBULIN SER CALC-MCNC: 3.7 G/DL (ref 1.9–3.5)
GLUCOSE BLD STRIP.AUTO-MCNC: 139 MG/DL (ref 65–99)
GLUCOSE BLD STRIP.AUTO-MCNC: 143 MG/DL (ref 65–99)
GLUCOSE BLD STRIP.AUTO-MCNC: 154 MG/DL (ref 65–99)
GLUCOSE SERPL-MCNC: 205 MG/DL (ref 65–99)
GLUCOSE UR STRIP.AUTO-MCNC: 500 MG/DL
HCT VFR BLD AUTO: 39.3 % (ref 42–52)
HGB BLD-MCNC: 13.8 G/DL (ref 14–18)
IMM GRANULOCYTES # BLD AUTO: 0.06 K/UL (ref 0–0.11)
IMM GRANULOCYTES NFR BLD AUTO: 1.1 % (ref 0–0.9)
KETONES UR STRIP.AUTO-MCNC: NEGATIVE MG/DL
LEUKOCYTE ESTERASE UR QL STRIP.AUTO: NEGATIVE
LIPASE SERPL-CCNC: 205 U/L (ref 11–82)
LYMPHOCYTES # BLD AUTO: 1.81 K/UL (ref 1–4.8)
LYMPHOCYTES NFR BLD: 33.8 % (ref 22–41)
MCH RBC QN AUTO: 30.5 PG (ref 27–33)
MCHC RBC AUTO-ENTMCNC: 35.1 G/DL (ref 32.3–36.5)
MCV RBC AUTO: 86.9 FL (ref 81.4–97.8)
MICRO URNS: ABNORMAL
MONOCYTES # BLD AUTO: 0.47 K/UL (ref 0–0.85)
MONOCYTES NFR BLD AUTO: 8.8 % (ref 0–13.4)
NEUTROPHILS # BLD AUTO: 2.96 K/UL (ref 1.82–7.42)
NEUTROPHILS NFR BLD: 55.2 % (ref 44–72)
NITRITE UR QL STRIP.AUTO: NEGATIVE
NOROVIRUS GI+II RNA STL QL NAA+NON-PROBE: NOT DETECTED
NRBC # BLD AUTO: 0 K/UL
NRBC BLD-RTO: 0 /100 WBC (ref 0–0.2)
P SHIGELLOIDES DNA STL QL NAA+NON-PROBE: NOT DETECTED
PH UR STRIP.AUTO: 5 [PH] (ref 5–8)
PLATELET # BLD AUTO: 230 K/UL (ref 164–446)
PMV BLD AUTO: 11.7 FL (ref 9–12.9)
POTASSIUM SERPL-SCNC: 5.5 MMOL/L (ref 3.6–5.5)
PROT SERPL-MCNC: 7.9 G/DL (ref 6–8.2)
PROT UR QL STRIP: 30 MG/DL
RBC # BLD AUTO: 4.52 M/UL (ref 4.7–6.1)
RBC # URNS HPF: NORMAL /HPF (ref 0–2)
RBC UR QL AUTO: NEGATIVE
RVA RNA STL QL NAA+NON-PROBE: NOT DETECTED
S ENT+BONG DNA STL QL NAA+NON-PROBE: NOT DETECTED
SAPO I+II+IV+V RNA STL QL NAA+NON-PROBE: NOT DETECTED
SHIGELLA SP+EIEC IPAH ST NAA+NON-PROBE: NOT DETECTED
SODIUM SERPL-SCNC: 133 MMOL/L (ref 135–145)
SP GR UR STRIP.AUTO: 1.01
TSH SERPL DL<=0.005 MIU/L-ACNC: 0.77 UIU/ML (ref 0.38–5.33)
UROBILINOGEN UR STRIP.AUTO-MCNC: 1 EU/DL
V CHOL+PARA+VUL DNA STL QL NAA+NON-PROBE: NOT DETECTED
V CHOLERAE DNA STL QL NAA+NON-PROBE: NOT DETECTED
WBC # BLD AUTO: 5.4 K/UL (ref 4.8–10.8)
WBC #/AREA URNS HPF: NORMAL /HPF
Y ENTEROCOL DNA STL QL NAA+NON-PROBE: NOT DETECTED

## 2025-02-20 PROCEDURE — 80053 COMPREHEN METABOLIC PANEL: CPT

## 2025-02-20 PROCEDURE — 84443 ASSAY THYROID STIM HORMONE: CPT

## 2025-02-20 PROCEDURE — 700102 HCHG RX REV CODE 250 W/ 637 OVERRIDE(OP): Performed by: HOSPITALIST

## 2025-02-20 PROCEDURE — 81001 URINALYSIS AUTO W/SCOPE: CPT

## 2025-02-20 PROCEDURE — 700105 HCHG RX REV CODE 258: Performed by: EMERGENCY MEDICINE

## 2025-02-20 PROCEDURE — 99285 EMERGENCY DEPT VISIT HI MDM: CPT

## 2025-02-20 PROCEDURE — 700105 HCHG RX REV CODE 258: Performed by: HOSPITALIST

## 2025-02-20 PROCEDURE — 700111 HCHG RX REV CODE 636 W/ 250 OVERRIDE (IP): Mod: JZ | Performed by: EMERGENCY MEDICINE

## 2025-02-20 PROCEDURE — 74176 CT ABD & PELVIS W/O CONTRAST: CPT

## 2025-02-20 PROCEDURE — G0378 HOSPITAL OBSERVATION PER HR: HCPCS

## 2025-02-20 PROCEDURE — 87507 IADNA-DNA/RNA PROBE TQ 12-25: CPT

## 2025-02-20 PROCEDURE — 82010 KETONE BODYS QUAN: CPT

## 2025-02-20 PROCEDURE — 85025 COMPLETE CBC W/AUTO DIFF WBC: CPT

## 2025-02-20 PROCEDURE — A9270 NON-COVERED ITEM OR SERVICE: HCPCS | Performed by: HOSPITALIST

## 2025-02-20 PROCEDURE — 82962 GLUCOSE BLOOD TEST: CPT | Mod: 91

## 2025-02-20 PROCEDURE — 96374 THER/PROPH/DIAG INJ IV PUSH: CPT

## 2025-02-20 PROCEDURE — 83690 ASSAY OF LIPASE: CPT

## 2025-02-20 PROCEDURE — 96375 TX/PRO/DX INJ NEW DRUG ADDON: CPT

## 2025-02-20 PROCEDURE — 36415 COLL VENOUS BLD VENIPUNCTURE: CPT

## 2025-02-20 RX ORDER — AMOXICILLIN 250 MG
2 CAPSULE ORAL EVERY EVENING
Status: DISCONTINUED | OUTPATIENT
Start: 2025-02-20 | End: 2025-02-22 | Stop reason: HOSPADM

## 2025-02-20 RX ORDER — LABETALOL HYDROCHLORIDE 5 MG/ML
10 INJECTION, SOLUTION INTRAVENOUS EVERY 4 HOURS PRN
Status: DISCONTINUED | OUTPATIENT
Start: 2025-02-20 | End: 2025-02-22 | Stop reason: HOSPADM

## 2025-02-20 RX ORDER — DEXTROSE MONOHYDRATE 25 G/50ML
25 INJECTION, SOLUTION INTRAVENOUS
Status: DISCONTINUED | OUTPATIENT
Start: 2025-02-20 | End: 2025-02-22 | Stop reason: HOSPADM

## 2025-02-20 RX ORDER — ATORVASTATIN CALCIUM 10 MG/1
10 TABLET, FILM COATED ORAL DAILY
Status: DISCONTINUED | OUTPATIENT
Start: 2025-02-20 | End: 2025-02-22 | Stop reason: HOSPADM

## 2025-02-20 RX ORDER — SODIUM CHLORIDE, SODIUM LACTATE, POTASSIUM CHLORIDE, CALCIUM CHLORIDE 600; 310; 30; 20 MG/100ML; MG/100ML; MG/100ML; MG/100ML
INJECTION, SOLUTION INTRAVENOUS CONTINUOUS
Status: ACTIVE | OUTPATIENT
Start: 2025-02-20 | End: 2025-02-21

## 2025-02-20 RX ORDER — ONDANSETRON 2 MG/ML
4 INJECTION INTRAMUSCULAR; INTRAVENOUS ONCE
Status: COMPLETED | OUTPATIENT
Start: 2025-02-20 | End: 2025-02-20

## 2025-02-20 RX ORDER — PROCHLORPERAZINE EDISYLATE 5 MG/ML
5-10 INJECTION INTRAMUSCULAR; INTRAVENOUS EVERY 4 HOURS PRN
Status: DISCONTINUED | OUTPATIENT
Start: 2025-02-20 | End: 2025-02-22 | Stop reason: HOSPADM

## 2025-02-20 RX ORDER — HEPARIN SODIUM 5000 [USP'U]/ML
5000 INJECTION, SOLUTION INTRAVENOUS; SUBCUTANEOUS EVERY 8 HOURS
Status: DISCONTINUED | OUTPATIENT
Start: 2025-02-21 | End: 2025-02-22 | Stop reason: HOSPADM

## 2025-02-20 RX ORDER — TADALAFIL 5 MG/1
5 TABLET ORAL
COMMUNITY
Start: 2024-10-03 | End: 2025-02-23

## 2025-02-20 RX ORDER — PROMETHAZINE HYDROCHLORIDE 25 MG/1
12.5-25 TABLET ORAL EVERY 4 HOURS PRN
Status: DISCONTINUED | OUTPATIENT
Start: 2025-02-20 | End: 2025-02-22 | Stop reason: HOSPADM

## 2025-02-20 RX ORDER — ACETAMINOPHEN 325 MG/1
650 TABLET ORAL EVERY 6 HOURS PRN
Status: DISCONTINUED | OUTPATIENT
Start: 2025-02-20 | End: 2025-02-22 | Stop reason: HOSPADM

## 2025-02-20 RX ORDER — ONDANSETRON 4 MG/1
4 TABLET, ORALLY DISINTEGRATING ORAL EVERY 4 HOURS PRN
Status: DISCONTINUED | OUTPATIENT
Start: 2025-02-20 | End: 2025-02-22 | Stop reason: HOSPADM

## 2025-02-20 RX ORDER — ASPIRIN 81 MG/1
81 TABLET ORAL
Status: DISCONTINUED | OUTPATIENT
Start: 2025-02-21 | End: 2025-02-22 | Stop reason: HOSPADM

## 2025-02-20 RX ORDER — SODIUM BICARBONATE 650 MG/1
650 TABLET ORAL 2 TIMES DAILY
Status: COMPLETED | OUTPATIENT
Start: 2025-02-20 | End: 2025-02-22

## 2025-02-20 RX ORDER — ASPIRIN 81 MG/1
81 TABLET ORAL
COMMUNITY

## 2025-02-20 RX ORDER — DEXTROMETHORPHAN HBR. AND GUAIFENESIN 10; 100 MG/5ML; MG/5ML
10 SOLUTION ORAL EVERY 4 HOURS PRN
COMMUNITY
Start: 2025-02-12 | End: 2025-02-23

## 2025-02-20 RX ORDER — SODIUM CHLORIDE, SODIUM LACTATE, POTASSIUM CHLORIDE, CALCIUM CHLORIDE 600; 310; 30; 20 MG/100ML; MG/100ML; MG/100ML; MG/100ML
1000 INJECTION, SOLUTION INTRAVENOUS ONCE
Status: COMPLETED | OUTPATIENT
Start: 2025-02-20 | End: 2025-02-20

## 2025-02-20 RX ORDER — ONDANSETRON 2 MG/ML
4 INJECTION INTRAMUSCULAR; INTRAVENOUS EVERY 4 HOURS PRN
Status: DISCONTINUED | OUTPATIENT
Start: 2025-02-20 | End: 2025-02-22 | Stop reason: HOSPADM

## 2025-02-20 RX ORDER — SEMAGLUTIDE 0.68 MG/ML
0.5 INJECTION, SOLUTION SUBCUTANEOUS
COMMUNITY
Start: 2025-01-24 | End: 2025-02-23

## 2025-02-20 RX ORDER — PROMETHAZINE HYDROCHLORIDE 25 MG/1
12.5-25 SUPPOSITORY RECTAL EVERY 4 HOURS PRN
Status: DISCONTINUED | OUTPATIENT
Start: 2025-02-20 | End: 2025-02-22 | Stop reason: HOSPADM

## 2025-02-20 RX ORDER — KETOCONAZOLE 20 MG/G
1 CREAM TOPICAL DAILY
Status: ON HOLD | COMMUNITY
Start: 2025-02-06 | End: 2025-02-22

## 2025-02-20 RX ORDER — AMLODIPINE BESYLATE 5 MG/1
5 TABLET ORAL DAILY
Status: DISCONTINUED | OUTPATIENT
Start: 2025-02-20 | End: 2025-02-21

## 2025-02-20 RX ORDER — INSULIN LISPRO 100 [IU]/ML
2-9 INJECTION, SOLUTION INTRAVENOUS; SUBCUTANEOUS
Status: DISCONTINUED | OUTPATIENT
Start: 2025-02-20 | End: 2025-02-22 | Stop reason: HOSPADM

## 2025-02-20 RX ORDER — POLYETHYLENE GLYCOL 3350 17 G/17G
1 POWDER, FOR SOLUTION ORAL
Status: DISCONTINUED | OUTPATIENT
Start: 2025-02-20 | End: 2025-02-22 | Stop reason: HOSPADM

## 2025-02-20 RX ORDER — ACETAMINOPHEN 325 MG/1
650 TABLET ORAL EVERY 6 HOURS PRN
COMMUNITY
Start: 2025-02-12

## 2025-02-20 RX ADMIN — SODIUM CHLORIDE, POTASSIUM CHLORIDE, SODIUM LACTATE AND CALCIUM CHLORIDE 1000 ML: 600; 310; 30; 20 INJECTION, SOLUTION INTRAVENOUS at 09:23

## 2025-02-20 RX ADMIN — ATORVASTATIN CALCIUM 10 MG: 10 TABLET, FILM COATED ORAL at 17:29

## 2025-02-20 RX ADMIN — AMLODIPINE BESYLATE 5 MG: 5 TABLET ORAL at 17:29

## 2025-02-20 RX ADMIN — ONDANSETRON 4 MG: 2 INJECTION INTRAMUSCULAR; INTRAVENOUS at 09:36

## 2025-02-20 RX ADMIN — SODIUM BICARBONATE 650 MG: 650 TABLET ORAL at 19:02

## 2025-02-20 RX ADMIN — INSULIN GLARGINE-YFGN 20 UNITS: 100 INJECTION, SOLUTION SUBCUTANEOUS at 20:15

## 2025-02-20 RX ADMIN — SODIUM CHLORIDE, POTASSIUM CHLORIDE, SODIUM LACTATE AND CALCIUM CHLORIDE: 600; 310; 30; 20 INJECTION, SOLUTION INTRAVENOUS at 17:25

## 2025-02-20 SDOH — ECONOMIC STABILITY: TRANSPORTATION INSECURITY
IN THE PAST 12 MONTHS, HAS LACK OF RELIABLE TRANSPORTATION KEPT YOU FROM MEDICAL APPOINTMENTS, MEETINGS, WORK OR FROM GETTING THINGS NEEDED FOR DAILY LIVING?: NO

## 2025-02-20 SDOH — ECONOMIC STABILITY: TRANSPORTATION INSECURITY
IN THE PAST 12 MONTHS, HAS THE LACK OF TRANSPORTATION KEPT YOU FROM MEDICAL APPOINTMENTS OR FROM GETTING MEDICATIONS?: NO

## 2025-02-20 ASSESSMENT — COGNITIVE AND FUNCTIONAL STATUS - GENERAL
SUGGESTED CMS G CODE MODIFIER DAILY ACTIVITY: CH
MOBILITY SCORE: 24
DAILY ACTIVITIY SCORE: 24
SUGGESTED CMS G CODE MODIFIER MOBILITY: CH

## 2025-02-20 ASSESSMENT — LIFESTYLE VARIABLES
HOW MANY TIMES IN THE PAST YEAR HAVE YOU HAD 5 OR MORE DRINKS IN A DAY: 0
DOES PATIENT WANT TO STOP DRINKING: NO
HAVE PEOPLE ANNOYED YOU BY CRITICIZING YOUR DRINKING: NO
AVERAGE NUMBER OF DAYS PER WEEK YOU HAVE A DRINK CONTAINING ALCOHOL: 0
EVER HAD A DRINK FIRST THING IN THE MORNING TO STEADY YOUR NERVES TO GET RID OF A HANGOVER: NO
ALCOHOL_USE: NO
TOTAL SCORE: 0
TOTAL SCORE: 0
CONSUMPTION TOTAL: NEGATIVE
EVER FELT BAD OR GUILTY ABOUT YOUR DRINKING: NO
HAVE YOU EVER FELT YOU SHOULD CUT DOWN ON YOUR DRINKING: NO
TOTAL SCORE: 0
ON A TYPICAL DAY WHEN YOU DRINK ALCOHOL HOW MANY DRINKS DO YOU HAVE: 0

## 2025-02-20 ASSESSMENT — FIBROSIS 4 INDEX
FIB4 SCORE: 1.19
FIB4 SCORE: 1.48

## 2025-02-20 ASSESSMENT — SOCIAL DETERMINANTS OF HEALTH (SDOH)
IN THE PAST 12 MONTHS, HAS THE ELECTRIC, GAS, OIL, OR WATER COMPANY THREATENED TO SHUT OFF SERVICE IN YOUR HOME?: NO
WITHIN THE LAST YEAR, HAVE YOU BEEN HUMILIATED OR EMOTIONALLY ABUSED IN OTHER WAYS BY YOUR PARTNER OR EX-PARTNER?: NO
WITHIN THE PAST 12 MONTHS, THE FOOD YOU BOUGHT JUST DIDN'T LAST AND YOU DIDN'T HAVE MONEY TO GET MORE: SOMETIMES TRUE
WITHIN THE LAST YEAR, HAVE YOU BEEN KICKED, HIT, SLAPPED, OR OTHERWISE PHYSICALLY HURT BY YOUR PARTNER OR EX-PARTNER?: NO
WITHIN THE LAST YEAR, HAVE TO BEEN RAPED OR FORCED TO HAVE ANY KIND OF SEXUAL ACTIVITY BY YOUR PARTNER OR EX-PARTNER?: NO
WITHIN THE LAST YEAR, HAVE YOU BEEN AFRAID OF YOUR PARTNER OR EX-PARTNER?: NO
WITHIN THE PAST 12 MONTHS, YOU WORRIED THAT YOUR FOOD WOULD RUN OUT BEFORE YOU GOT THE MONEY TO BUY MORE: SOMETIMES TRUE

## 2025-02-20 ASSESSMENT — PATIENT HEALTH QUESTIONNAIRE - PHQ9
SUM OF ALL RESPONSES TO PHQ9 QUESTIONS 1 AND 2: 0
1. LITTLE INTEREST OR PLEASURE IN DOING THINGS: NOT AT ALL
SUM OF ALL RESPONSES TO PHQ9 QUESTIONS 1 AND 2: 0
2. FEELING DOWN, DEPRESSED, IRRITABLE, OR HOPELESS: NOT AT ALL
1. LITTLE INTEREST OR PLEASURE IN DOING THINGS: NOT AT ALL
2. FEELING DOWN, DEPRESSED, IRRITABLE, OR HOPELESS: NOT AT ALL

## 2025-02-20 ASSESSMENT — ENCOUNTER SYMPTOMS
STRIDOR: 0
EYE DISCHARGE: 0
FOCAL WEAKNESS: 0
VOMITING: 1
BRUISES/BLEEDS EASILY: 0
NAUSEA: 1
FLANK PAIN: 0
MYALGIAS: 0
SHORTNESS OF BREATH: 0
EYE REDNESS: 0
NERVOUS/ANXIOUS: 0
ABDOMINAL PAIN: 1
FEVER: 0
CHILLS: 0
COUGH: 0

## 2025-02-20 ASSESSMENT — PAIN DESCRIPTION - PAIN TYPE
TYPE: ACUTE PAIN
TYPE: ACUTE PAIN

## 2025-02-20 NOTE — ED PROVIDER NOTES
ED Provider Note    CHIEF COMPLAINT  Chief Complaint   Patient presents with    Abdominal Pain     Intermittent x1 week    N/V     Intermittent x1 week       EXTERNAL RECORDS REVIEWED  Reviewed medication list baseline laboratory studies    HPI/ROS  LIMITATION TO HISTORY   None  OUTSIDE HISTORIAN(S):  None    Juan Almaguer is a 59 y.o. male who presents for a constellation of symptoms including nausea and vomiting without diarrhea epigastric discomfort poor oral intake.  The patient has a history of diabetes.  He does generally not seek medical attention all that much..  He denies hematemesis hematochezia or melena.  He reports his blood sugars have been slightly high but not bad.  No report of any high fevers or productive cough.  He denies any flank pain or urinary symptoms such as dysuria or hematuria.  No recent antibiotic use.  No recent diarrhea he primarily has abdominal discomfort with nausea and vomiting    PAST MEDICAL HISTORY   has a past medical history of Diabetes (HCC).    SURGICAL HISTORY  patient denies any surgical history    FAMILY HISTORY  History reviewed. No pertinent family history.    SOCIAL HISTORY  Social History     Tobacco Use    Smoking status: Never    Smokeless tobacco: Never   Vaping Use    Vaping status: Never Used   Substance and Sexual Activity    Alcohol use: Not Currently    Drug use: Not Currently    Sexual activity: Not on file   No IV drug use    CURRENT MEDICATIONS  Home Medications       Reviewed by Jing Elias R.N. (Registered Nurse) on 02/20/25 at 0828  Med List Status: Not Addressed     Medication Last Dose Status   amLODIPine (NORVASC) 5 MG Tab  Active   atorvastatin (LIPITOR) 10 MG Tab  Active   baclofen (LIORESAL) 10 MG Tab  Active   cyanocobalamin (VITAMIN B-12) 500 MCG Tab  Active   glimepiride (AMARYL) 2 MG Tab  Active   hydroCHLOROthiazide (HYDRODIURIL) 25 MG Tab  Active   insulin glargine (LANTUS) 100 UNIT/ML SC SOLN  Active   JARDIANCE 10 MG Tab  "tablet  Active   lisinopril (PRINIVIL) 20 MG Tab  Active   pioglitazone (ACTOS) 15 MG Tab  Active   terbinafine (LAMISIL) 250 MG Tab  Active                  Audit from Redirected Encounters    **Home medications have not yet been reviewed for this encounter**         ALLERGIES  Allergies   Allergen Reactions    Penicillins Itching       PHYSICAL EXAM  VITAL SIGNS: /64   Pulse 78   Temp 37.2 °C (98.9 °F) (Temporal)   Resp 18   Ht 1.651 m (5' 5\")   Wt 80.9 kg (178 lb 5.6 oz)   SpO2 96%   BMI 29.68 kg/m²    Pulse ox interpretation: I interpret this pulse ox as normal.  Constitutional: Alert and oriented x 3, Distress  HEENT: Atraumatic normocephalic, pupils are equal round reactive to light extraocular movements are intact. The nares is clear, external ears are normal, mouth shows dry mucous membranes normal dentition for age  Neck: Supple, no JVD no tracheal deviation  Cardiovascular: Regular rate and rhythm no murmur rub or gallop 2+ pulses peripherally x4  Thorax & Lungs: No respiratory distress, no wheezes rales or rhonchi, No chest tenderness.   GI: Soft moderate diffuse tenderness without palpable hernia or mass no pulsatile mass no rebound or guarding   skin: Warm dry no acute rash or lesion  Musculoskeletal: Moving all extremities with full range and 5 of 5 strength no acute  deformity  Neurologic: Cranial nerves III through XII are grossly intact no sensory deficit no cerebellar dysfunction   Psychiatric: Appropriate affect for situation at this time          EKG/LABS  Results for orders placed or performed during the hospital encounter of 02/20/25   CBC WITH DIFFERENTIAL    Collection Time: 02/20/25  8:34 AM   Result Value Ref Range    WBC 5.4 4.8 - 10.8 K/uL    RBC 4.52 (L) 4.70 - 6.10 M/uL    Hemoglobin 13.8 (L) 14.0 - 18.0 g/dL    Hematocrit 39.3 (L) 42.0 - 52.0 %    MCV 86.9 81.4 - 97.8 fL    MCH 30.5 27.0 - 33.0 pg    MCHC 35.1 32.3 - 36.5 g/dL    RDW 42.5 35.9 - 50.0 fL    Platelet Count " 230 164 - 446 K/uL    MPV 11.7 9.0 - 12.9 fL    Neutrophils-Polys 55.20 44.00 - 72.00 %    Lymphocytes 33.80 22.00 - 41.00 %    Monocytes 8.80 0.00 - 13.40 %    Eosinophils 0.70 0.00 - 6.90 %    Basophils 0.40 0.00 - 1.80 %    Immature Granulocytes 1.10 (H) 0.00 - 0.90 %    Nucleated RBC 0.00 0.00 - 0.20 /100 WBC    Neutrophils (Absolute) 2.96 1.82 - 7.42 K/uL    Lymphs (Absolute) 1.81 1.00 - 4.80 K/uL    Monos (Absolute) 0.47 0.00 - 0.85 K/uL    Eos (Absolute) 0.04 0.00 - 0.51 K/uL    Baso (Absolute) 0.02 0.00 - 0.12 K/uL    Immature Granulocytes (abs) 0.06 0.00 - 0.11 K/uL    NRBC (Absolute) 0.00 K/uL   COMP METABOLIC PANEL    Collection Time: 02/20/25  8:34 AM   Result Value Ref Range    Sodium 133 (L) 135 - 145 mmol/L    Potassium 5.5 3.6 - 5.5 mmol/L    Chloride 103 96 - 112 mmol/L    Co2 17 (L) 20 - 33 mmol/L    Anion Gap 13.0 7.0 - 16.0    Glucose 205 (H) 65 - 99 mg/dL    Bun 85 (H) 8 - 22 mg/dL    Creatinine 3.55 (H) 0.50 - 1.40 mg/dL    Calcium 8.8 8.5 - 10.5 mg/dL    Correct Calcium 8.6 8.5 - 10.5 mg/dL    AST(SGOT) 32 12 - 45 U/L    ALT(SGPT) 33 2 - 50 U/L    Alkaline Phosphatase 95 30 - 99 U/L    Total Bilirubin 0.5 0.1 - 1.5 mg/dL    Albumin 4.2 3.2 - 4.9 g/dL    Total Protein 7.9 6.0 - 8.2 g/dL    Globulin 3.7 (H) 1.9 - 3.5 g/dL    A-G Ratio 1.1 g/dL   LIPASE    Collection Time: 02/20/25  8:34 AM   Result Value Ref Range    Lipase 205 (H) 11 - 82 U/L   ESTIMATED GFR    Collection Time: 02/20/25  8:34 AM   Result Value Ref Range    GFR (CKD-EPI) 19 (A) >60 mL/min/1.73 m 2   BETA-HYDROXYBUTYRIC ACID    Collection Time: 02/20/25  8:34 AM   Result Value Ref Range    beta-Hydroxybutyric Acid 0.11 0.02 - 0.27 mmol/L   TSH WITH REFLEX TO FT4    Collection Time: 02/20/25  8:34 AM   Result Value Ref Range    TSH 0.766 0.380 - 5.330 uIU/mL       I have independently interpreted this EKG    RADIOLOGY/PROCEDURES   I have independently interpreted the diagnostic imaging associated with this visit and am waiting the  final reading from the radiologist.   My preliminary interpretation is as follows: Gallstones without cholecystitis no other acute process    Radiologist interpretation:  CT-ABDOMEN-PELVIS W/O   Final Result      1.  No urolithiasis or hydronephrosis.   2.  No acute inflammatory abnormality.   3.  Cholelithiasis.   4.  Atherosclerosis.   5.  Circumscribed lesion in the subcutaneous left gluteal soft tissues as described above.          COURSE & MEDICAL DECISION MAKING    ASSESSMENT, COURSE AND PLAN  Care Narrative:     This is a pleasant 59-year-old gentleman has a history of diabetes who has a weeklong history of nausea poor oral intake without evidence of upper or lower GI bleeding.  An IV was established.  Here he was nauseous and was given IV antiemetics as well as a fluid bolus.  His metabolic panel here is quite concerning is that he has profound elevation in his BUN and creatinine from his baseline consistent with prerenal AMADO.  His lipase was also slightly elevated with normal LFTs.  Subsequent CT scan of the abdomen pelvis without contrast to preserve kidney function was performed.  It does demonstrate gallstones but no cholecystitis or any other acute process.  Patient will be admitted to the hospital service for further treatment and evaluation of AMADO.  He may ultimately require MRCP or HIDA scan to better clarify the elevation of the lipase.  There is no evidence of sepsis or peritonitis or need for surgical consultation.    Hydration: Based on the patient's presentation of Acute Kidney Injury the patient was given IV fluids. IV Hydration was used because oral hydration was not adequate alone. Upon recheck following hydration, the patient was improving.          ADDITIONAL PROBLEMS MANAGED      DISPOSITION AND DISCUSSIONS  I have discussed management of the patient with the following physicians and SID's:    discussed plan of care with hospitalist    Discussion of management with other QHP or appropriate  source(s): None    Escalation of care considered, and ultimately not performed: None    Barriers to care at this time, including but not limited to: None.     Decision tools and prescription drugs considered including, but not limited to: None.    FINAL DIAGNOSIS  1. AMADO (acute kidney injury) (HCC)    2. Elevated lipase         Electronically signed by: Artem Arriaga M.D., 2/20/2025 9:03 AM

## 2025-02-20 NOTE — ED NOTES
Med rec completed per patient, with Rx bottles at bedside (reviewed and returned).    Allergies reviewed.    Outpatient antibiotics within the last 30 days: NONE.    ANTICOAGULANTS: NONE.    Preferred pharmacy: [facility].

## 2025-02-20 NOTE — H&P
Hospital Medicine History & Physical Note    Date of Service  2/20/2025    Primary Care Physician  Venkatesh Long M.D.    Consultants  None      Code Status  Full Code    Chief Complaint  Chief Complaint   Patient presents with    Abdominal Pain     Intermittent x1 week    N/V     Intermittent x1 week     History of Presenting Illness  Juan Almaguer is a 59 y.o. male with a past medical history of primary hypertension, diabetes, hyperlipidemia who presented 2/20/2025 with abdominal pain, nausea, vomiting and generalized weakness.  Patient reports that has not been feeling well over the past 6 days but symptoms got much worse over the past 2 days days.  He has generalized abdominal pain, pain is diffuse.  He also has nausea and vomiting.  He denies having diarrhea.  There is no blood in his vomitus.  No recent antibiotic use.     I discussed the plan of care with emergency physician, the patient    Review of Systems  Review of Systems   Constitutional:  Positive for malaise/fatigue. Negative for chills and fever.   Eyes:  Negative for discharge and redness.   Respiratory:  Negative for cough, shortness of breath and stridor.    Cardiovascular:  Negative for chest pain and leg swelling.   Gastrointestinal:  Positive for abdominal pain, nausea and vomiting.   Genitourinary:  Negative for flank pain.   Musculoskeletal:  Negative for myalgias.   Skin: Negative.    Neurological:  Negative for focal weakness.   Endo/Heme/Allergies:  Does not bruise/bleed easily.   Psychiatric/Behavioral:  The patient is not nervous/anxious.      Past Medical History   has a past medical history of Diabetes (HCC).    Surgical History   has no past surgical history on file.     Family History  family history is not on file.      Social History   reports that he has never smoked. He has never used smokeless tobacco. He reports that he does not currently use alcohol. He reports that he does not currently use  drugs.    Allergies  Allergies   Allergen Reactions    Penicillins Unspecified     Patient unsure of reaction     Medications  Prior to Admission Medications   Prescriptions Last Dose Informant Patient Reported? Taking?   JARDIANCE 10 MG Tab tablet   No No   Sig: Take 1 Tablet by mouth every day.   amLODIPine (NORVASC) 5 MG Tab   No No   Sig: Take 1 Tablet by mouth every day.   atorvastatin (LIPITOR) 10 MG Tab   Yes No   Sig: Take 10 mg by mouth every evening.   baclofen (LIORESAL) 10 MG Tab   Yes No   Sig: Take 10 mg by mouth 3 times a day.   cyanocobalamin (VITAMIN B-12) 500 MCG Tab   Yes No   Sig: Take 500 mcg by mouth every day.   Patient not taking: Reported on 1/29/2025   glimepiride (AMARYL) 2 MG Tab   Yes No   Sig: Take 2 mg by mouth every morning.   hydroCHLOROthiazide (HYDRODIURIL) 25 MG Tab   Yes No   Sig: Take 25 mg by mouth every day.   insulin glargine (LANTUS) 100 UNIT/ML SC SOLN   Yes No   Sig: Inject 20 Units under the skin every evening.   lisinopril (PRINIVIL) 20 MG Tab   Yes No   Sig: Take 40 mg by mouth every day.   pioglitazone (ACTOS) 15 MG Tab   Yes No   Sig: Take 15 mg by mouth every day.   terbinafine (LAMISIL) 250 MG Tab   Yes No   Sig: Take 250 mg by mouth every day.      Facility-Administered Medications: None     Physical Exam  Temp:  [37.2 °C (98.9 °F)] 37.2 °C (98.9 °F)  Pulse:  [71-86] 80  Resp:  [18] 18  BP: (119-155)/(63-81) 155/77  SpO2:  [92 %-97 %] 95 %  Blood Pressure: (!) 147/74   Temperature: 37.2 °C (98.9 °F)   Pulse: 81   Respiration: 18   Pulse Oximetry: 96 %     Physical Exam  Constitutional:       General: He is not in acute distress.     Appearance: He is not ill-appearing or diaphoretic.   HENT:      Head: Atraumatic.      Right Ear: External ear normal.      Left Ear: External ear normal.      Nose: No congestion or rhinorrhea.      Mouth/Throat:      Mouth: Mucous membranes are dry.   Eyes:      General: No scleral icterus.        Right eye: No discharge.          "Left eye: No discharge.      Pupils: Pupils are equal, round, and reactive to light.   Cardiovascular:      Rate and Rhythm: Normal rate and regular rhythm.   Pulmonary:      Effort: Pulmonary effort is normal.      Comments: Saturating well on room air.  Is able to speak in full sentences  Abdominal:      General: There is no distension.      Tenderness: There is abdominal tenderness. There is no guarding or rebound.   Musculoskeletal:      Cervical back: Neck supple. No rigidity. No muscular tenderness.      Right lower leg: No edema.      Left lower leg: No edema.   Skin:     General: Skin is dry.      Capillary Refill: Capillary refill takes 2 to 3 seconds.      Coloration: Skin is pale. Skin is not jaundiced.   Neurological:      Mental Status: He is alert and oriented to person, place, and time.      Coordination: Coordination normal.   Psychiatric:         Mood and Affect: Mood normal.         Behavior: Behavior normal.       Laboratory:  Recent Labs     02/20/25  0834   WBC 5.4   RBC 4.52*   HEMOGLOBIN 13.8*   HEMATOCRIT 39.3*   MCV 86.9   MCH 30.5   MCHC 35.1   RDW 42.5   PLATELETCT 230   MPV 11.7     Recent Labs     02/20/25  0834   SODIUM 133*   POTASSIUM 5.5   CHLORIDE 103   CO2 17*   GLUCOSE 205*   BUN 85*   CREATININE 3.55*   CALCIUM 8.8     Recent Labs     02/20/25  0834   ALTSGPT 33   ASTSGOT 32   ALKPHOSPHAT 95   TBILIRUBIN 0.5   LIPASE 205*   GLUCOSE 205*         No results for input(s): \"NTPROBNP\" in the last 72 hours.      No results for input(s): \"TROPONINT\" in the last 72 hours.    Imaging:  CT-ABDOMEN-PELVIS W/O   Final Result      1.  No urolithiasis or hydronephrosis.   2.  No acute inflammatory abnormality.   3.  Cholelithiasis.   4.  Atherosclerosis.   5.  Circumscribed lesion in the subcutaneous left gluteal soft tissues as described above.        Assessment/Plan:  Justification for Admission Status  I anticipate this patient is appropriate for observation status at this time because " patient has dehydration with acute kidney injury.    Patient will need a Med/Surg bed on MEDICAL service.      * AMADO (acute kidney injury) (HCC)- (present on admission)  Assessment & Plan  Mostly due to dehydration   I will start intravenous fluids  Avoid / minimize nephrotoxins as much as possible.  I will hold home lisinopril and hydrochlorothiazide   Monitor inputs and outputs     Dehydration- (present on admission)  Assessment & Plan  Likely secondary to reduced oral intake, and increase in insensible loss secondary to vomiting  Encourage oral intake as tolerated, antiemetics as needed.  Intravenous hydration until adequate oral intake is achieved.      Metabolic acidosis- (present on admission)  Assessment & Plan  Likely due to reduced intravascular volume and increase bicarb losses through vomiting   I will start intravenous fluids and bicarb  Anticipate improvement with intravenous fluids  Continue to monitor, I will order follow-up bicarb level         Hyponatremia- (present on admission)  Assessment & Plan  Hypovolemic hyponatremia  I expect Na to improve with IVF     Primary hypertension- (present on admission)  Assessment & Plan  I will hold home lisinopril and hydrochlorothiazide given his acute kidney injury.  I will resume home amlodipine with hold parameters.  I will start labetalol as needed for extreme hypertension.    Type 2 diabetes mellitus with hyperglycemia, without long-term current use of insulin (HCC)- (present on admission)  Assessment & Plan  With hyperglycemia  I will check a glycated hemoglobin    I will start long & short acting insulin for now  I will order Accu-Checks, hypoglycemia protocol  Adjust according to blood sugars trend     Mixed hyperlipidemia- (present on admission)  Assessment & Plan  Cardiac diet.  I will start atorvastatin      VTE prophylaxis: SCDs/TEDs and heparin ppx

## 2025-02-20 NOTE — ASSESSMENT & PLAN NOTE
Mostly due to dehydration   S/p IV fluid administration  Oral intake encouraged  Avoid / minimize nephrotoxins as much as possible.  Monitor inputs and outputs

## 2025-02-20 NOTE — ASSESSMENT & PLAN NOTE
Likely secondary to reduced oral intake, and increase in insensible loss secondary to vomiting  Encourage oral intake as tolerated, antiemetics as needed.  Intravenous hydration until adequate oral intake is achieved.

## 2025-02-20 NOTE — ED TRIAGE NOTES
"Chief Complaint   Patient presents with    Abdominal Pain     Intermittent x1 week    N/V     Intermittent x1 week     /81   Pulse 86   Temp 37.2 °C (98.9 °F) (Temporal)   Resp 18   Ht 1.651 m (5' 5\")   Wt 80.9 kg (178 lb 5.6 oz)   SpO2 97%   BMI 29.68 kg/m²     Pt ambulated into triage. Pt also reports intermittent full body tingling, and twitching of arms and legs. Educated on triage process. Instructed to notify staff for any worsening symptoms.  "

## 2025-02-20 NOTE — ASSESSMENT & PLAN NOTE
With hyperglycemia  Long & short acting insulin for now  Accu-Checks, hypoglycemia protocol  Adjust according to blood sugars trend

## 2025-02-20 NOTE — ASSESSMENT & PLAN NOTE
Resume home amlodipine, increase to 10 mg  Metoprolol tartrate ordered 2/22  As needed antihypertensives available

## 2025-02-21 LAB
ALBUMIN SERPL BCP-MCNC: 3.6 G/DL (ref 3.2–4.9)
ALBUMIN/GLOB SERPL: 1.2 G/DL
ALP SERPL-CCNC: 77 U/L (ref 30–99)
ALT SERPL-CCNC: 24 U/L (ref 2–50)
ANION GAP SERPL CALC-SCNC: 9 MMOL/L (ref 7–16)
AST SERPL-CCNC: 22 U/L (ref 12–45)
BILIRUB SERPL-MCNC: 0.3 MG/DL (ref 0.1–1.5)
BUN SERPL-MCNC: 77 MG/DL (ref 8–22)
CALCIUM ALBUM COR SERPL-MCNC: 8.5 MG/DL (ref 8.5–10.5)
CALCIUM SERPL-MCNC: 8.2 MG/DL (ref 8.5–10.5)
CHLORIDE SERPL-SCNC: 109 MMOL/L (ref 96–112)
CO2 SERPL-SCNC: 19 MMOL/L (ref 20–33)
CREAT SERPL-MCNC: 3.03 MG/DL (ref 0.5–1.4)
ERYTHROCYTE [DISTWIDTH] IN BLOOD BY AUTOMATED COUNT: 43.9 FL (ref 35.9–50)
EST. AVERAGE GLUCOSE BLD GHB EST-MCNC: 111 MG/DL
GFR SERPLBLD CREATININE-BSD FMLA CKD-EPI: 23 ML/MIN/1.73 M 2
GLOBULIN SER CALC-MCNC: 3 G/DL (ref 1.9–3.5)
GLUCOSE BLD STRIP.AUTO-MCNC: 107 MG/DL (ref 65–99)
GLUCOSE BLD STRIP.AUTO-MCNC: 110 MG/DL (ref 65–99)
GLUCOSE BLD STRIP.AUTO-MCNC: 140 MG/DL (ref 65–99)
GLUCOSE BLD STRIP.AUTO-MCNC: 85 MG/DL (ref 65–99)
GLUCOSE SERPL-MCNC: 129 MG/DL (ref 65–99)
HBA1C MFR BLD: 5.5 % (ref 4–5.6)
HCT VFR BLD AUTO: 35 % (ref 42–52)
HGB BLD-MCNC: 11.9 G/DL (ref 14–18)
MAGNESIUM SERPL-MCNC: 2.8 MG/DL (ref 1.5–2.5)
MCH RBC QN AUTO: 30.3 PG (ref 27–33)
MCHC RBC AUTO-ENTMCNC: 34 G/DL (ref 32.3–36.5)
MCV RBC AUTO: 89.1 FL (ref 81.4–97.8)
PLATELET # BLD AUTO: 190 K/UL (ref 164–446)
PMV BLD AUTO: 12 FL (ref 9–12.9)
POTASSIUM SERPL-SCNC: 5.8 MMOL/L (ref 3.6–5.5)
PROT SERPL-MCNC: 6.6 G/DL (ref 6–8.2)
RBC # BLD AUTO: 3.93 M/UL (ref 4.7–6.1)
SODIUM SERPL-SCNC: 137 MMOL/L (ref 135–145)
WBC # BLD AUTO: 6.6 K/UL (ref 4.8–10.8)

## 2025-02-21 PROCEDURE — G0378 HOSPITAL OBSERVATION PER HR: HCPCS

## 2025-02-21 PROCEDURE — 700105 HCHG RX REV CODE 258: Performed by: HOSPITALIST

## 2025-02-21 PROCEDURE — 700111 HCHG RX REV CODE 636 W/ 250 OVERRIDE (IP): Performed by: HOSPITALIST

## 2025-02-21 PROCEDURE — 85027 COMPLETE CBC AUTOMATED: CPT

## 2025-02-21 PROCEDURE — 700102 HCHG RX REV CODE 250 W/ 637 OVERRIDE(OP): Performed by: HOSPITALIST

## 2025-02-21 PROCEDURE — 96372 THER/PROPH/DIAG INJ SC/IM: CPT

## 2025-02-21 PROCEDURE — 83036 HEMOGLOBIN GLYCOSYLATED A1C: CPT

## 2025-02-21 PROCEDURE — 36415 COLL VENOUS BLD VENIPUNCTURE: CPT

## 2025-02-21 PROCEDURE — A9270 NON-COVERED ITEM OR SERVICE: HCPCS | Performed by: HOSPITALIST

## 2025-02-21 PROCEDURE — 83735 ASSAY OF MAGNESIUM: CPT

## 2025-02-21 PROCEDURE — 80053 COMPREHEN METABOLIC PANEL: CPT

## 2025-02-21 PROCEDURE — 82962 GLUCOSE BLOOD TEST: CPT | Mod: 91

## 2025-02-21 RX ORDER — AMLODIPINE BESYLATE 5 MG/1
10 TABLET ORAL DAILY
Status: DISCONTINUED | OUTPATIENT
Start: 2025-02-22 | End: 2025-02-22 | Stop reason: HOSPADM

## 2025-02-21 RX ORDER — HYDRALAZINE HYDROCHLORIDE 20 MG/ML
10 INJECTION INTRAMUSCULAR; INTRAVENOUS EVERY 6 HOURS PRN
Status: DISCONTINUED | OUTPATIENT
Start: 2025-02-21 | End: 2025-02-22 | Stop reason: HOSPADM

## 2025-02-21 RX ADMIN — HEPARIN SODIUM 5000 UNITS: 5000 INJECTION, SOLUTION INTRAVENOUS; SUBCUTANEOUS at 22:19

## 2025-02-21 RX ADMIN — SODIUM BICARBONATE 650 MG: 650 TABLET ORAL at 05:55

## 2025-02-21 RX ADMIN — SODIUM BICARBONATE 650 MG: 650 TABLET ORAL at 18:41

## 2025-02-21 RX ADMIN — ATORVASTATIN CALCIUM 10 MG: 10 TABLET, FILM COATED ORAL at 05:55

## 2025-02-21 RX ADMIN — AMLODIPINE BESYLATE 5 MG: 5 TABLET ORAL at 05:55

## 2025-02-21 RX ADMIN — SODIUM CHLORIDE, POTASSIUM CHLORIDE, SODIUM LACTATE AND CALCIUM CHLORIDE: 600; 310; 30; 20 INJECTION, SOLUTION INTRAVENOUS at 03:19

## 2025-02-21 RX ADMIN — INSULIN GLARGINE-YFGN 20 UNITS: 100 INJECTION, SOLUTION SUBCUTANEOUS at 18:39

## 2025-02-21 RX ADMIN — ASPIRIN 81 MG: 81 TABLET, COATED ORAL at 09:14

## 2025-02-21 ASSESSMENT — PAIN DESCRIPTION - PAIN TYPE
TYPE: ACUTE PAIN
TYPE: ACUTE PAIN

## 2025-02-21 ASSESSMENT — ENCOUNTER SYMPTOMS
BLURRED VISION: 0
PSYCHIATRIC NEGATIVE: 1
DOUBLE VISION: 0
CHILLS: 0
FEVER: 0
VOMITING: 0
ORTHOPNEA: 0
HEARTBURN: 0
NEUROLOGICAL NEGATIVE: 1
NAUSEA: 0
COUGH: 0
MUSCULOSKELETAL NEGATIVE: 1

## 2025-02-21 ASSESSMENT — FIBROSIS 4 INDEX: FIB4 SCORE: 1.44

## 2025-02-21 NOTE — CARE PLAN
The patient is Stable - Low risk of patient condition declining or worsening    Shift Goals  Clinical Goals: IV, monitor labs  Patient Goals: discharge  Family Goals: JORDAN    Progress made toward(s) clinical / shift goals:    Problem: Pain - Standard  Goal: Alleviation of pain or a reduction in pain to the patient’s comfort goal  2/21/2025 0834 by Zain Woods R.N.  Outcome: Progressing  2/21/2025 0833 by Zain Woods R.N.  Outcome: Progressing     Problem: Knowledge Deficit - Standard  Goal: Patient and family/care givers will demonstrate understanding of plan of care, disease process/condition, diagnostic tests and medications  2/21/2025 0834 by Zain Woods R.N.  Outcome: Progressing  2/21/2025 0833 by Zain Woods R.N.  Outcome: Progressing     Problem: Physical Regulation:  Goal: Diagnostic test results will improve  Outcome: Progressing

## 2025-02-21 NOTE — CARE PLAN
The patient is Stable - Low risk of patient condition declining or worsening    Shift Goals  Clinical Goals: nausea control, IV fluids  Patient Goals: feel better      Problem: Pain - Standard  Goal: Alleviation of pain or a reduction in pain to the patient’s comfort goal  Description: Target End Date:  Prior to discharge or change in level of care    Document on Vitals flowsheet    1.  Document pain using the appropriate pain scale per order or unit policy  2.  Educate and implement non-pharmacologic comfort measures (i.e. relaxation, distraction, massage, cold/heat therapy, etc.)  3.  Pain management medications as ordered  4.  Reassess pain after pain med administration per policy  5.  If opiods administered assess patient's response to pain medication is appropriate per POSS sedation scale  6.  Follow pain management plan developed in collaboration with patient and interdisciplinary team (including palliative care or pain specialists if applicable)  Outcome: Progressing     Problem: Knowledge Deficit - Standard  Goal: Patient and family/care givers will demonstrate understanding of plan of care, disease process/condition, diagnostic tests and medications  Description: Target End Date:  1-3 days or as soon as patient condition allows    Document in Patient Education    1.  Patient and family/caregiver oriented to unit, equipment, visitation policy and means for communicating concern  2.  Complete/review Learning Assessment  3.  Assess knowledge level of disease process/condition, treatment plan, diagnostic tests and medications  4.  Explain disease process/condition, treatment plan, diagnostic tests and medications  Outcome: Progressing

## 2025-02-21 NOTE — PROGRESS NOTES
4 Eyes Skin Assessment Completed by DIMA León and DIMA Pierce.    Head WDL  Ears WDL  Nose WDL  Mouth WDL  Neck WDL  Breast/Chest WDL  Shoulder Blades WDL  Spine WDL  (R) Arm/Elbow/Hand WDL  (L) Arm/Elbow/Hand WDL  Abdomen WDL  Groin WDL  Scrotum/Coccyx/Buttocks WDL  (R) Leg WDL  (L) Leg WDL  (R) Heel/Foot/Toe WDL  (L) Heel/Foot/Toe WDL          Devices In Places Tele Box, Blood Pressure Cuff, and Pulse Ox      Interventions In Place Pillows    Possible Skin Injury No    Pictures Uploaded Into Epic N/A  Wound Consult Placed N/A  RN Wound Prevention Protocol Ordered No

## 2025-02-21 NOTE — HOSPITAL COURSE
Juan Almaguer is a 59 y.o. male with a past medical history of primary hypertension, diabetes, hyperlipidemia who presented 2/20/2025 with abdominal pain, nausea, vomiting and generalized weakness.  Patient reports that has not been feeling well over the past 6 days but symptoms got much worse over the past 2 days days.  He has generalized abdominal pain, pain is diffuse.  He also has nausea and vomiting.  He denies having diarrhea.  There is no blood in his vomitus.  No recent antibiotic use.

## 2025-02-21 NOTE — PROGRESS NOTES
Pt tranfered from U to S5-1 via wheelchair, escorted by transport. Report received, orders reviewed. Pt updated on plan of care, trending labs, patient states he will need to leave by tomorrow so as not to miss more work. Pt requesting shower. No further needs at this time.    [address]  [address] [facility] [id number]  [phone]    Work/School Note    Date: 8/30/2022    To Whom It May concern:    Shanthi Rodriguez was seen and treated today in the emergency room by the following provider(s):  Attending Provider: Dawna Gonzales MD  Nurse Practitioner: Jannice Hammans, Madelyn Eubanks NP. Shanthi Rodriguez is excused from work/school on 8/30/2022 through 9/1/2022. He is medically clear to return to work/school on 9/2/2022.          Sincerely,          Dov Espinoza NP

## 2025-02-21 NOTE — PROGRESS NOTES
Assumed care of pt. Pt A&Ox4, NAD, VSS on RA. Denies pain. Call light in reach. Bed in lowest position. Plan of care discussed with pt. Pt agreeing to plan of care. Communication board updated. All questions answered. Assessment completed.

## 2025-02-21 NOTE — PROGRESS NOTES
"Hospital Medicine Daily Progress Note    Date of Service  2/21/2025    Chief Complaint  Juan Almaguer is a 59 y.o. male admitted 2/20/2025 with abdominal pain    Hospital Course  Juan Almaguer is a 59 y.o. male with a past medical history of primary hypertension, diabetes, hyperlipidemia who presented 2/20/2025 with abdominal pain, nausea, vomiting and generalized weakness.  Patient reports that has not been feeling well over the past 6 days but symptoms got much worse over the past 2 days days.  He has generalized abdominal pain, pain is diffuse.  He also has nausea and vomiting.  He denies having diarrhea.  There is no blood in his vomitus.  No recent antibiotic use.     Interval Problem Update  The patient feels \"great\"    No nausea no vomiting no abdominal pain at this time    Afebrile    Creatinine has improved with hydration and bicarb.      However BUN and creatinine is still significantly abnormal with a BUN of 77 and a creatinine of 3      Bp is now elevated      Patient still requires further aggressive IV hydration to help with normalization of his kidney function.      I have discussed this patient's plan of care and discharge plan at IDT rounds today with Case Management, Nursing, Nursing leadership, and other members of the IDT team.    Consultants/Specialty      Code Status  Full Code    Disposition  The patient is not medically cleared for discharge to home or a post-acute facility.  Anticipate discharge to: home with close outpatient follow-up    I have placed the appropriate orders for post-discharge needs.    Review of Systems  Review of Systems   Constitutional:  Positive for malaise/fatigue. Negative for chills and fever.   HENT:  Negative for hearing loss and tinnitus.    Eyes:  Negative for blurred vision and double vision.   Respiratory:  Negative for cough.    Cardiovascular:  Negative for chest pain and orthopnea.   Gastrointestinal:  Negative for heartburn, nausea and " vomiting.   Genitourinary: Negative.    Musculoskeletal: Negative.    Neurological: Negative.    Psychiatric/Behavioral: Negative.          Physical Exam  Temp:  [36.7 °C (98 °F)-37.2 °C (98.9 °F)] 37.2 °C (98.9 °F)  Pulse:  [68-81] 78  Resp:  [16-18] 18  BP: (119-170)/(63-79) 161/74  SpO2:  [92 %-96 %] 94 %    Physical Exam  Vitals and nursing note reviewed.   Constitutional:       General: He is not in acute distress.     Appearance: He is diaphoretic. He is not ill-appearing.   HENT:      Mouth/Throat:      Mouth: Mucous membranes are dry.   Eyes:      Pupils: Pupils are equal, round, and reactive to light.   Cardiovascular:      Rate and Rhythm: Normal rate and regular rhythm.      Heart sounds: No murmur heard.     No gallop.   Pulmonary:      Effort: No respiratory distress.      Breath sounds: No stridor. No wheezing or rhonchi.   Abdominal:      General: There is no distension.      Palpations: There is no mass.      Tenderness: There is no abdominal tenderness.      Hernia: No hernia is present.   Musculoskeletal:         General: No swelling, tenderness, deformity or signs of injury. Normal range of motion.      Cervical back: Normal range of motion. No rigidity.      Right lower leg: No edema.      Left lower leg: No edema.   Lymphadenopathy:      Cervical: No cervical adenopathy.   Skin:     General: Skin is warm.      Capillary Refill: Capillary refill takes 2 to 3 seconds.   Neurological:      General: No focal deficit present.      Mental Status: He is oriented to person, place, and time.      Cranial Nerves: No cranial nerve deficit.      Sensory: No sensory deficit.      Motor: No weakness.      Coordination: Coordination normal.   Psychiatric:         Mood and Affect: Mood normal.         Behavior: Behavior normal.         Fluids  No intake or output data in the 24 hours ending 02/21/25 0959     Laboratory  Recent Labs     02/20/25  0834 02/21/25  0106   WBC 5.4 6.6   RBC 4.52* 3.93*   HEMOGLOBIN  13.8* 11.9*   HEMATOCRIT 39.3* 35.0*   MCV 86.9 89.1   MCH 30.5 30.3   MCHC 35.1 34.0   RDW 42.5 43.9   PLATELETCT 230 190   MPV 11.7 12.0     Recent Labs     02/20/25  0834 02/21/25  0106   SODIUM 133* 137   POTASSIUM 5.5 5.8*   CHLORIDE 103 109   CO2 17* 19*   GLUCOSE 205* 129*   BUN 85* 77*   CREATININE 3.55* 3.03*   CALCIUM 8.8 8.2*                   Imaging  CT-ABDOMEN-PELVIS W/O   Final Result      1.  No urolithiasis or hydronephrosis.   2.  No acute inflammatory abnormality.   3.  Cholelithiasis.   4.  Atherosclerosis.   5.  Circumscribed lesion in the subcutaneous left gluteal soft tissues as described above.           Assessment/Plan  * AMADO (acute kidney injury) (HCC)- (present on admission)  Assessment & Plan  Mostly due to dehydration   ntravenous fluids  Avoid / minimize nephrotoxins as much as possible.  hold home lisinopril and hydrochlorothiazide   Monitor inputs and outputs     Dehydration- (present on admission)  Assessment & Plan  Likely secondary to reduced oral intake, and increase in insensible loss secondary to vomiting  Encourage oral intake as tolerated, antiemetics as needed.  Intravenous hydration until adequate oral intake is achieved.      Metabolic acidosis- (present on admission)  Assessment & Plan  Likely due to reduced intravascular volume and increase bicarb losses through vomiting   intravenous fluids and bicarb         Hyponatremia- (present on admission)  Assessment & Plan  Hypovolemic hyponatremia  I expect Na to improve with IVF     Mixed hyperlipidemia- (present on admission)  Assessment & Plan  Cardiac diet.  atorvastatin    Primary hypertension- (present on admission)  Assessment & Plan  hold home lisinopril and hydrochlorothiazide given his acute kidney injury.  I will resume home amlodipine with hold parameters.  labetalol as needed for extreme hypertension.    Type 2 diabetes mellitus with hyperglycemia, without long-term current use of insulin (HCC)- (present on  admission)  Assessment & Plan  With hyperglycemia     long & short acting insulin for now  Accu-Checks, hypoglycemia protocol  Adjust according to blood sugars trend          VTE prophylaxis:   SCDs/TEDs      I have performed a physical exam and reviewed and updated ROS and Plan today (2/21/2025). In review of yesterday's note (2/20/2025), there are no changes except as documented above.    Greater than 36 minutes spent prepping to see patient (e.g. review of tests) obtaining and/or reviewing separately obtained history. Performing a medically appropriate examination and/ evaluation.  Counseling and educating the patient/family/caregiver.  Ordering medications, tests, or procedures.  Referring and communicating with other health care professionals.  Documenting clinical information in EPIC.  Independently interpreting results and communicating results to patient/family/caregiver.  Care coordination.

## 2025-02-21 NOTE — CARE PLAN
The patient is Stable - Low risk of patient condition declining or worsening    Shift Goals  Clinical Goals: IVF, control symptoms  Patient Goals: Comfort, rest  Family Goals: Not at bedside    Progress made toward(s) clinical / shift goals:      Problem: Pain - Standard  Goal: Alleviation of pain or a reduction in pain to the patient’s comfort goal  Description: Target End Date:  Prior to discharge or change in level of care    Document on Vitals flowsheet    1.  Document pain using the appropriate pain scale per order or unit policy  2.  Educate and implement non-pharmacologic comfort measures (i.e. relaxation, distraction, massage, cold/heat therapy, etc.)  3.  Pain management medications as ordered  4.  Reassess pain after pain med administration per policy  5.  If opiods administered assess patient's response to pain medication is appropriate per POSS sedation scale  6.  Follow pain management plan developed in collaboration with patient and interdisciplinary team (including palliative care or pain specialists if applicable)  Outcome: Progressing     Problem: Knowledge Deficit - Standard  Goal: Patient and family/care givers will demonstrate understanding of plan of care, disease process/condition, diagnostic tests and medications  Description: Target End Date:  1-3 days or as soon as patient condition allows    Document in Patient Education    1.  Patient and family/caregiver oriented to unit, equipment, visitation policy and means for communicating concern  2.  Complete/review Learning Assessment  3.  Assess knowledge level of disease process/condition, treatment plan, diagnostic tests and medications  4.  Explain disease process/condition, treatment plan, diagnostic tests and medications  Outcome: Progressing       Patient is not progressing towards the following goals:

## 2025-02-22 VITALS
SYSTOLIC BLOOD PRESSURE: 176 MMHG | TEMPERATURE: 98.2 F | HEART RATE: 81 BPM | HEIGHT: 65 IN | BODY MASS INDEX: 29.31 KG/M2 | OXYGEN SATURATION: 94 % | RESPIRATION RATE: 24 BRPM | WEIGHT: 175.93 LBS | DIASTOLIC BLOOD PRESSURE: 91 MMHG

## 2025-02-22 PROBLEM — E87.5 HYPERKALEMIA: Status: ACTIVE | Noted: 2025-02-22

## 2025-02-22 LAB
ANION GAP SERPL CALC-SCNC: 10 MMOL/L (ref 7–16)
ANION GAP SERPL CALC-SCNC: 9 MMOL/L (ref 7–16)
BUN SERPL-MCNC: 50 MG/DL (ref 8–22)
BUN SERPL-MCNC: 58 MG/DL (ref 8–22)
CALCIUM SERPL-MCNC: 8.8 MG/DL (ref 8.5–10.5)
CALCIUM SERPL-MCNC: 9.5 MG/DL (ref 8.5–10.5)
CHLORIDE SERPL-SCNC: 105 MMOL/L (ref 96–112)
CHLORIDE SERPL-SCNC: 106 MMOL/L (ref 96–112)
CO2 SERPL-SCNC: 19 MMOL/L (ref 20–33)
CO2 SERPL-SCNC: 21 MMOL/L (ref 20–33)
CREAT SERPL-MCNC: 2.07 MG/DL (ref 0.5–1.4)
CREAT SERPL-MCNC: 2.07 MG/DL (ref 0.5–1.4)
EKG IMPRESSION: NORMAL
EKG IMPRESSION: NORMAL
ERYTHROCYTE [DISTWIDTH] IN BLOOD BY AUTOMATED COUNT: 42.6 FL (ref 35.9–50)
GFR SERPLBLD CREATININE-BSD FMLA CKD-EPI: 36 ML/MIN/1.73 M 2
GFR SERPLBLD CREATININE-BSD FMLA CKD-EPI: 36 ML/MIN/1.73 M 2
GLUCOSE BLD STRIP.AUTO-MCNC: 146 MG/DL (ref 65–99)
GLUCOSE BLD STRIP.AUTO-MCNC: 152 MG/DL (ref 65–99)
GLUCOSE BLD STRIP.AUTO-MCNC: 158 MG/DL (ref 65–99)
GLUCOSE BLD STRIP.AUTO-MCNC: 161 MG/DL (ref 65–99)
GLUCOSE BLD STRIP.AUTO-MCNC: 225 MG/DL (ref 65–99)
GLUCOSE BLD STRIP.AUTO-MCNC: 74 MG/DL (ref 65–99)
GLUCOSE SERPL-MCNC: 139 MG/DL (ref 65–99)
GLUCOSE SERPL-MCNC: 82 MG/DL (ref 65–99)
HCT VFR BLD AUTO: 38.7 % (ref 42–52)
HGB BLD-MCNC: 13.1 G/DL (ref 14–18)
MCH RBC QN AUTO: 30.1 PG (ref 27–33)
MCHC RBC AUTO-ENTMCNC: 33.9 G/DL (ref 32.3–36.5)
MCV RBC AUTO: 89 FL (ref 81.4–97.8)
PLATELET # BLD AUTO: 202 K/UL (ref 164–446)
PMV BLD AUTO: 11.4 FL (ref 9–12.9)
POTASSIUM SERPL-SCNC: 5.3 MMOL/L (ref 3.6–5.5)
POTASSIUM SERPL-SCNC: 5.8 MMOL/L (ref 3.6–5.5)
RBC # BLD AUTO: 4.35 M/UL (ref 4.7–6.1)
SODIUM SERPL-SCNC: 135 MMOL/L (ref 135–145)
SODIUM SERPL-SCNC: 135 MMOL/L (ref 135–145)
TROPONIN T SERPL-MCNC: 21 NG/L (ref 6–19)
WBC # BLD AUTO: 8.8 K/UL (ref 4.8–10.8)

## 2025-02-22 PROCEDURE — 93010 ELECTROCARDIOGRAM REPORT: CPT | Performed by: INTERNAL MEDICINE

## 2025-02-22 PROCEDURE — 36415 COLL VENOUS BLD VENIPUNCTURE: CPT

## 2025-02-22 PROCEDURE — 96372 THER/PROPH/DIAG INJ SC/IM: CPT

## 2025-02-22 PROCEDURE — 700111 HCHG RX REV CODE 636 W/ 250 OVERRIDE (IP): Mod: JZ | Performed by: HOSPITALIST

## 2025-02-22 PROCEDURE — 93005 ELECTROCARDIOGRAM TRACING: CPT | Mod: TC | Performed by: STUDENT IN AN ORGANIZED HEALTH CARE EDUCATION/TRAINING PROGRAM

## 2025-02-22 PROCEDURE — A9270 NON-COVERED ITEM OR SERVICE: HCPCS | Performed by: HOSPITALIST

## 2025-02-22 PROCEDURE — 80048 BASIC METABOLIC PNL TOTAL CA: CPT

## 2025-02-22 PROCEDURE — 82962 GLUCOSE BLOOD TEST: CPT | Mod: 91

## 2025-02-22 PROCEDURE — 700101 HCHG RX REV CODE 250: Performed by: STUDENT IN AN ORGANIZED HEALTH CARE EDUCATION/TRAINING PROGRAM

## 2025-02-22 PROCEDURE — 85027 COMPLETE CBC AUTOMATED: CPT

## 2025-02-22 PROCEDURE — 84484 ASSAY OF TROPONIN QUANT: CPT

## 2025-02-22 PROCEDURE — 96375 TX/PRO/DX INJ NEW DRUG ADDON: CPT

## 2025-02-22 PROCEDURE — 700105 HCHG RX REV CODE 258: Performed by: STUDENT IN AN ORGANIZED HEALTH CARE EDUCATION/TRAINING PROGRAM

## 2025-02-22 PROCEDURE — 700102 HCHG RX REV CODE 250 W/ 637 OVERRIDE(OP): Performed by: STUDENT IN AN ORGANIZED HEALTH CARE EDUCATION/TRAINING PROGRAM

## 2025-02-22 PROCEDURE — 700111 HCHG RX REV CODE 636 W/ 250 OVERRIDE (IP): Mod: JZ | Performed by: STUDENT IN AN ORGANIZED HEALTH CARE EDUCATION/TRAINING PROGRAM

## 2025-02-22 PROCEDURE — G0378 HOSPITAL OBSERVATION PER HR: HCPCS

## 2025-02-22 PROCEDURE — 700102 HCHG RX REV CODE 250 W/ 637 OVERRIDE(OP): Performed by: HOSPITALIST

## 2025-02-22 PROCEDURE — 700111 HCHG RX REV CODE 636 W/ 250 OVERRIDE (IP): Performed by: HOSPITALIST

## 2025-02-22 PROCEDURE — 96365 THER/PROPH/DIAG IV INF INIT: CPT

## 2025-02-22 PROCEDURE — A9270 NON-COVERED ITEM OR SERVICE: HCPCS | Performed by: STUDENT IN AN ORGANIZED HEALTH CARE EDUCATION/TRAINING PROGRAM

## 2025-02-22 RX ORDER — METOPROLOL TARTRATE 25 MG/1
25 TABLET, FILM COATED ORAL 2 TIMES DAILY
Qty: 180 TABLET | Refills: 0 | Status: SHIPPED | OUTPATIENT
Start: 2025-02-23 | End: 2025-05-24

## 2025-02-22 RX ORDER — SODIUM CHLORIDE, SODIUM LACTATE, POTASSIUM CHLORIDE, CALCIUM CHLORIDE 600; 310; 30; 20 MG/100ML; MG/100ML; MG/100ML; MG/100ML
INJECTION, SOLUTION INTRAVENOUS CONTINUOUS
Status: DISCONTINUED | OUTPATIENT
Start: 2025-02-22 | End: 2025-02-22 | Stop reason: HOSPADM

## 2025-02-22 RX ORDER — AMLODIPINE BESYLATE 10 MG/1
10 TABLET ORAL DAILY
Qty: 100 TABLET | Refills: 3 | Status: ON HOLD | OUTPATIENT
Start: 2025-02-23 | End: 2025-02-26

## 2025-02-22 RX ORDER — CALCIUM GLUCONATE 20 MG/ML
2 INJECTION, SOLUTION INTRAVENOUS ONCE
Status: COMPLETED | OUTPATIENT
Start: 2025-02-22 | End: 2025-02-22

## 2025-02-22 RX ORDER — DEXTROSE MONOHYDRATE 25 G/50ML
25 INJECTION, SOLUTION INTRAVENOUS ONCE
Status: COMPLETED | OUTPATIENT
Start: 2025-02-22 | End: 2025-02-22

## 2025-02-22 RX ORDER — METOPROLOL TARTRATE 25 MG/1
25 TABLET, FILM COATED ORAL 2 TIMES DAILY
Status: DISCONTINUED | OUTPATIENT
Start: 2025-02-22 | End: 2025-02-22 | Stop reason: HOSPADM

## 2025-02-22 RX ADMIN — SODIUM CHLORIDE, POTASSIUM CHLORIDE, SODIUM LACTATE AND CALCIUM CHLORIDE: 600; 310; 30; 20 INJECTION, SOLUTION INTRAVENOUS at 17:40

## 2025-02-22 RX ADMIN — METOPROLOL TARTRATE 25 MG: 25 TABLET, FILM COATED ORAL at 15:22

## 2025-02-22 RX ADMIN — INSULIN HUMAN 5 UNITS: 100 INJECTION, SOLUTION PARENTERAL at 09:32

## 2025-02-22 RX ADMIN — INSULIN GLARGINE-YFGN 15 UNITS: 100 INJECTION, SOLUTION SUBCUTANEOUS at 17:42

## 2025-02-22 RX ADMIN — AMLODIPINE BESYLATE 10 MG: 10 TABLET ORAL at 05:44

## 2025-02-22 RX ADMIN — DEXTROSE MONOHYDRATE 25 G: 25 INJECTION, SOLUTION INTRAVENOUS at 09:32

## 2025-02-22 RX ADMIN — SODIUM BICARBONATE 650 MG: 650 TABLET ORAL at 05:44

## 2025-02-22 RX ADMIN — INSULIN LISPRO 2 UNITS: 100 INJECTION, SOLUTION INTRAVENOUS; SUBCUTANEOUS at 17:43

## 2025-02-22 RX ADMIN — SENNOSIDES AND DOCUSATE SODIUM 2 TABLET: 50; 8.6 TABLET ORAL at 17:39

## 2025-02-22 RX ADMIN — HEPARIN SODIUM 5000 UNITS: 5000 INJECTION, SOLUTION INTRAVENOUS; SUBCUTANEOUS at 15:22

## 2025-02-22 RX ADMIN — ATORVASTATIN CALCIUM 10 MG: 10 TABLET, FILM COATED ORAL at 05:44

## 2025-02-22 RX ADMIN — HYDRALAZINE HYDROCHLORIDE 10 MG: 20 INJECTION, SOLUTION INTRAMUSCULAR; INTRAVENOUS at 10:39

## 2025-02-22 RX ADMIN — HEPARIN SODIUM 5000 UNITS: 5000 INJECTION, SOLUTION INTRAVENOUS; SUBCUTANEOUS at 05:43

## 2025-02-22 RX ADMIN — CALCIUM GLUCONATE 2 G: 20 INJECTION, SOLUTION INTRAVENOUS at 09:44

## 2025-02-22 ASSESSMENT — ENCOUNTER SYMPTOMS: ABDOMINAL PAIN: 0

## 2025-02-22 ASSESSMENT — FIBROSIS 4 INDEX: FIB4 SCORE: 1.36

## 2025-02-22 NOTE — PROGRESS NOTES
Assumed care of pt at 1900. Report received and bedside rounding completed with day RN. Pt is calm no SOB, no acute distress noted. Pt edge of bed for dinner.     POC discussed with pt, questions answered. White board updated. Call light and pt belongings within reach - safety precautions and hourly rounding in place. See flowsheets for assessment.

## 2025-02-22 NOTE — CARE PLAN
The patient is Watcher - Medium risk of patient condition declining or worsening    Shift Goals  Clinical Goals: monitor labs  Patient Goals: discharge, rest  Family Goals: JORDAN    Progress made toward(s) clinical / shift goals:        Problem: Pain - Standard  Goal: Alleviation of pain or a reduction in pain to the patient’s comfort goal  Outcome: Progressing     Problem: Knowledge Deficit - Standard  Goal: Patient and family/care givers will demonstrate understanding of plan of care, disease process/condition, diagnostic tests and medications  Outcome: Progressing     Problem: Physical Regulation:  Goal: Diagnostic test results will improve  Outcome: Progressing

## 2025-02-22 NOTE — PROGRESS NOTES
Hospital Medicine Daily Progress Note    Date of Service  2/22/2025    Chief Complaint  Juan Almaguer is a 59 y.o. male admitted 2/20/2025 with abdominal pain    Hospital Course  Juan Almaguer is a 59 y.o. male with a past medical history of primary hypertension, diabetes, hyperlipidemia who presented 2/20/2025 with abdominal pain, nausea, vomiting and generalized weakness.  Patient reports that has not been feeling well over the past 6 days but symptoms got much worse over the past 2 days days.  He has generalized abdominal pain, pain is diffuse.  He also has nausea and vomiting.  He denies having diarrhea.  There is no blood in his vomitus.  No recent antibiotic use.     Interval Problem Update  Patient seen and evaluated at bedside  Pertinent labs and imaging reviewed  Creatinine 2.07, 2.07  CT abdomen pelvis completed 2/20 reviewed and reveals no urolithiasis or hydronephrosis.  No acute inflammatory abnormality.  Cholelithiasis.  Atherosclerosis.  Circumscribed lesion in the subcutaneous left gluteal soft tissues.  No abdominal pain  Afebrile, hemodynamically stable, no oxygen requirements  Voiding, stooling, tolerating oral intake well  Previous bowel movement 2/21  K5.8  EKG completed and revealed evidence of T wave changes per my assessment  Patient provided calcium, dextrose, and insulin for hyperkalemia  Repeat K5.3  Repeat EKG ordered and pending  Patient denied chest pain  Troponin 21  Trend troponin  Echocardiogram ordered and pending  Ongoing elevated blood pressures, metoprolol tartrate ordered as I am avoiding nephrotoxic agents in setting of AMADO on CKD  Maintenance IV fluid ordered for 12 hours  Renal ultrasound ordered and pending    I have discussed this patient's plan of care and discharge plan at IDT rounds today with Case Management, Nursing, Nursing leadership, and other members of the IDT team.    Consultants/Specialty  None    Code Status  Full Code    Disposition  The  patient is not medically cleared for discharge to home or a post-acute facility.      I have placed the appropriate orders for post-discharge needs.    Review of Systems  Review of Systems   Gastrointestinal:  Negative for abdominal pain.        Physical Exam  Temp:  [36.7 °C (98 °F)-36.8 °C (98.2 °F)] 36.8 °C (98.2 °F)  Pulse:  [75-85] 81  Resp:  [18-24] 24  BP: (142-184)/(73-91) 176/91  SpO2:  [91 %-97 %] 94 %    Physical Exam  Constitutional:       General: He is not in acute distress.     Appearance: Normal appearance. He is normal weight.   HENT:      Head: Normocephalic and atraumatic.      Nose: Nose normal.   Eyes:      Extraocular Movements: Extraocular movements intact.   Pulmonary:      Effort: Pulmonary effort is normal. No respiratory distress.   Musculoskeletal:      Cervical back: Normal range of motion.      Right lower leg: No edema.      Left lower leg: No edema.   Skin:     General: Skin is dry.   Neurological:      General: No focal deficit present.      Mental Status: He is alert and oriented to person, place, and time.   Psychiatric:         Mood and Affect: Mood normal.         Behavior: Behavior normal.         Thought Content: Thought content normal.         Fluids    Intake/Output Summary (Last 24 hours) at 2/22/2025 1719  Last data filed at 2/22/2025 1400  Gross per 24 hour   Intake 1040 ml   Output 450 ml   Net 590 ml        Laboratory  Recent Labs     02/20/25  0834 02/21/25  0106 02/22/25  0559   WBC 5.4 6.6 8.8   RBC 4.52* 3.93* 4.35*   HEMOGLOBIN 13.8* 11.9* 13.1*   HEMATOCRIT 39.3* 35.0* 38.7*   MCV 86.9 89.1 89.0   MCH 30.5 30.3 30.1   MCHC 35.1 34.0 33.9   RDW 42.5 43.9 42.6   PLATELETCT 230 190 202   MPV 11.7 12.0 11.4     Recent Labs     02/21/25  0106 02/22/25  0559 02/22/25  1211   SODIUM 137 135 135   POTASSIUM 5.8* 5.8* 5.3   CHLORIDE 109 106 105   CO2 19* 19* 21   GLUCOSE 129* 82 139*   BUN 77* 58* 50*   CREATININE 3.03* 2.07* 2.07*   CALCIUM 8.2* 8.8 9.5                    Imaging  CT-ABDOMEN-PELVIS W/O   Final Result      1.  No urolithiasis or hydronephrosis.   2.  No acute inflammatory abnormality.   3.  Cholelithiasis.   4.  Atherosclerosis.   5.  Circumscribed lesion in the subcutaneous left gluteal soft tissues as described above.      EC-ECHOCARDIOGRAM COMPLETE W/ CONT    (Results Pending)   US-RENAL    (Results Pending)        Assessment/Plan  * AMADO (acute kidney injury) (HCC)- (present on admission)  Assessment & Plan  Mostly due to dehydration   S/p IV fluid administration  Oral intake encouraged  Avoid / minimize nephrotoxins as much as possible.  Monitor inputs and outputs     Hyperkalemia  Assessment & Plan  Present 2/21, 2/22  Suspected T wave changes  S/p calcium, insulin, dextrose administration  Interval improvement in potassium  Repeat K in a.m.    Dehydration- (present on admission)  Assessment & Plan  Likely secondary to reduced oral intake, and increase in insensible loss secondary to vomiting  Encourage oral intake as tolerated, antiemetics as needed.  Intravenous hydration until adequate oral intake is achieved.      Metabolic acidosis- (present on admission)  Assessment & Plan  Resolved    Hyponatremia- (present on admission)  Assessment & Plan  Hypovolemic hyponatremia  Resolved    Mixed hyperlipidemia- (present on admission)  Assessment & Plan  Cardiac diet.  Continue home atorvastatin    Primary hypertension- (present on admission)  Assessment & Plan  Resume home amlodipine, increase to 10 mg  Metoprolol tartrate ordered 2/22  As needed antihypertensives available    Type 2 diabetes mellitus with hyperglycemia, without long-term current use of insulin (HCC)- (present on admission)  Assessment & Plan  With hyperglycemia  Long & short acting insulin for now  Accu-Checks, hypoglycemia protocol  Adjust according to blood sugars trend          VTE prophylaxis:    heparin ppx      I have performed a physical exam and reviewed and updated ROS and Plan today  (2/22/2025). In review of yesterday's note (2/21/2025), there are no changes except as documented above.      Patient is critically ill.   The patient continues to have: Hyperkalemia with suspected T wave changes  The vital organ system that is affected is the: Cardiac  If untreated there is a high chance of deterioration into: Arrhythmia  And eventually death.   The critical care that I am providing today is: insulin, calcium, dextose admin in the setting of hyper k with suspected EKG changes. Tele transfer  The critical that has been undertaken is medically complex.   There has been no overlap in critical care time.   Critical Care Time not including procedures: 52 min

## 2025-02-22 NOTE — PROGRESS NOTES
February 22, 2025     Patient: Juan Almaguer   YOB: 1965   Date of Visit: 2/20/2025 to February 22, 2025         To Whom It May Concern:     Juan Almaguer was seen and treated at a [facility].  Please excuse him from work until he is medically cleared for discharged.    Claire Long MD MPH  [facility]

## 2025-02-23 ENCOUNTER — HOSPITAL ENCOUNTER (INPATIENT)
Facility: MEDICAL CENTER | Age: 60
LOS: 3 days | End: 2025-02-26
Attending: EMERGENCY MEDICINE | Admitting: HOSPITALIST
Payer: COMMERCIAL

## 2025-02-23 DIAGNOSIS — I10 UNCONTROLLED HYPERTENSION: ICD-10-CM

## 2025-02-23 DIAGNOSIS — F06.0 PSYCHOTIC DISORDER DUE TO MEDICAL CONDITION WITH HALLUCINATIONS: ICD-10-CM

## 2025-02-23 DIAGNOSIS — N17.9 ACUTE RENAL FAILURE SUPERIMPOSED ON CHRONIC KIDNEY DISEASE, UNSPECIFIED ACUTE RENAL FAILURE TYPE, UNSPECIFIED CKD STAGE (HCC): ICD-10-CM

## 2025-02-23 DIAGNOSIS — E87.5 HYPERKALEMIA: ICD-10-CM

## 2025-02-23 DIAGNOSIS — R44.1 VISUAL HALLUCINATIONS: ICD-10-CM

## 2025-02-23 DIAGNOSIS — N18.9 ACUTE RENAL FAILURE SUPERIMPOSED ON CHRONIC KIDNEY DISEASE, UNSPECIFIED ACUTE RENAL FAILURE TYPE, UNSPECIFIED CKD STAGE (HCC): ICD-10-CM

## 2025-02-23 PROBLEM — R44.3 HALLUCINATIONS: Status: ACTIVE | Noted: 2025-02-23

## 2025-02-23 LAB
ANION GAP SERPL CALC-SCNC: 12 MMOL/L (ref 7–16)
ANION GAP SERPL CALC-SCNC: 12 MMOL/L (ref 7–16)
ANION GAP SERPL CALC-SCNC: 14 MMOL/L (ref 7–16)
ANION GAP SERPL CALC-SCNC: 16 MMOL/L (ref 7–16)
BASOPHILS # BLD AUTO: 0.5 % (ref 0–1.8)
BASOPHILS # BLD: 0.04 K/UL (ref 0–0.12)
BUN SERPL-MCNC: 46 MG/DL (ref 8–22)
BUN SERPL-MCNC: 48 MG/DL (ref 8–22)
BUN SERPL-MCNC: 48 MG/DL (ref 8–22)
BUN SERPL-MCNC: 51 MG/DL (ref 8–22)
CALCIUM SERPL-MCNC: 10 MG/DL (ref 8.5–10.5)
CALCIUM SERPL-MCNC: 10.6 MG/DL (ref 8.5–10.5)
CALCIUM SERPL-MCNC: 8.7 MG/DL (ref 8.5–10.5)
CALCIUM SERPL-MCNC: 9.3 MG/DL (ref 8.5–10.5)
CHLORIDE SERPL-SCNC: 101 MMOL/L (ref 96–112)
CHLORIDE SERPL-SCNC: 102 MMOL/L (ref 96–112)
CO2 SERPL-SCNC: 18 MMOL/L (ref 20–33)
CO2 SERPL-SCNC: 20 MMOL/L (ref 20–33)
CO2 SERPL-SCNC: 21 MMOL/L (ref 20–33)
CO2 SERPL-SCNC: 21 MMOL/L (ref 20–33)
CREAT SERPL-MCNC: 2.12 MG/DL (ref 0.5–1.4)
CREAT SERPL-MCNC: 2.14 MG/DL (ref 0.5–1.4)
CREAT SERPL-MCNC: 2.15 MG/DL (ref 0.5–1.4)
CREAT SERPL-MCNC: 2.28 MG/DL (ref 0.5–1.4)
EKG IMPRESSION: NORMAL
EKG IMPRESSION: NORMAL
EOSINOPHIL # BLD AUTO: 0.06 K/UL (ref 0–0.51)
EOSINOPHIL NFR BLD: 0.8 % (ref 0–6.9)
ERYTHROCYTE [DISTWIDTH] IN BLOOD BY AUTOMATED COUNT: 42.6 FL (ref 35.9–50)
GFR SERPLBLD CREATININE-BSD FMLA CKD-EPI: 32 ML/MIN/1.73 M 2
GFR SERPLBLD CREATININE-BSD FMLA CKD-EPI: 34 ML/MIN/1.73 M 2
GFR SERPLBLD CREATININE-BSD FMLA CKD-EPI: 34 ML/MIN/1.73 M 2
GFR SERPLBLD CREATININE-BSD FMLA CKD-EPI: 35 ML/MIN/1.73 M 2
GLUCOSE BLD STRIP.AUTO-MCNC: 100 MG/DL (ref 65–99)
GLUCOSE BLD STRIP.AUTO-MCNC: 106 MG/DL (ref 65–99)
GLUCOSE BLD STRIP.AUTO-MCNC: 110 MG/DL (ref 65–99)
GLUCOSE BLD STRIP.AUTO-MCNC: 119 MG/DL (ref 65–99)
GLUCOSE BLD STRIP.AUTO-MCNC: 119 MG/DL (ref 65–99)
GLUCOSE BLD STRIP.AUTO-MCNC: 178 MG/DL (ref 65–99)
GLUCOSE BLD STRIP.AUTO-MCNC: 214 MG/DL (ref 65–99)
GLUCOSE BLD STRIP.AUTO-MCNC: 99 MG/DL (ref 65–99)
GLUCOSE SERPL-MCNC: 102 MG/DL (ref 65–99)
GLUCOSE SERPL-MCNC: 131 MG/DL (ref 65–99)
GLUCOSE SERPL-MCNC: 270 MG/DL (ref 65–99)
GLUCOSE SERPL-MCNC: 99 MG/DL (ref 65–99)
HCT VFR BLD AUTO: 41.2 % (ref 42–52)
HGB BLD-MCNC: 14.2 G/DL (ref 14–18)
IMM GRANULOCYTES # BLD AUTO: 0.02 K/UL (ref 0–0.11)
IMM GRANULOCYTES NFR BLD AUTO: 0.3 % (ref 0–0.9)
LYMPHOCYTES # BLD AUTO: 1.47 K/UL (ref 1–4.8)
LYMPHOCYTES NFR BLD: 18.7 % (ref 22–41)
MCH RBC QN AUTO: 30.3 PG (ref 27–33)
MCHC RBC AUTO-ENTMCNC: 34.5 G/DL (ref 32.3–36.5)
MCV RBC AUTO: 88 FL (ref 81.4–97.8)
MONOCYTES # BLD AUTO: 0.53 K/UL (ref 0–0.85)
MONOCYTES NFR BLD AUTO: 6.8 % (ref 0–13.4)
NEUTROPHILS # BLD AUTO: 5.73 K/UL (ref 1.82–7.42)
NEUTROPHILS NFR BLD: 72.9 % (ref 44–72)
NRBC # BLD AUTO: 0 K/UL
NRBC BLD-RTO: 0 /100 WBC (ref 0–0.2)
PLATELET # BLD AUTO: 222 K/UL (ref 164–446)
PMV BLD AUTO: 11.3 FL (ref 9–12.9)
POTASSIUM SERPL-SCNC: 5 MMOL/L (ref 3.6–5.5)
POTASSIUM SERPL-SCNC: 5.2 MMOL/L (ref 3.6–5.5)
POTASSIUM SERPL-SCNC: 6.1 MMOL/L (ref 3.6–5.5)
POTASSIUM SERPL-SCNC: 7.4 MMOL/L (ref 3.6–5.5)
RBC # BLD AUTO: 4.68 M/UL (ref 4.7–6.1)
SODIUM SERPL-SCNC: 133 MMOL/L (ref 135–145)
SODIUM SERPL-SCNC: 135 MMOL/L (ref 135–145)
SODIUM SERPL-SCNC: 135 MMOL/L (ref 135–145)
SODIUM SERPL-SCNC: 136 MMOL/L (ref 135–145)
TROPONIN T SERPL-MCNC: 28 NG/L (ref 6–19)
WBC # BLD AUTO: 7.9 K/UL (ref 4.8–10.8)

## 2025-02-23 PROCEDURE — 700111 HCHG RX REV CODE 636 W/ 250 OVERRIDE (IP): Mod: JZ | Performed by: INTERNAL MEDICINE

## 2025-02-23 PROCEDURE — 36415 COLL VENOUS BLD VENIPUNCTURE: CPT

## 2025-02-23 PROCEDURE — 96366 THER/PROPH/DIAG IV INF ADDON: CPT

## 2025-02-23 PROCEDURE — A9270 NON-COVERED ITEM OR SERVICE: HCPCS | Performed by: STUDENT IN AN ORGANIZED HEALTH CARE EDUCATION/TRAINING PROGRAM

## 2025-02-23 PROCEDURE — A9270 NON-COVERED ITEM OR SERVICE: HCPCS | Performed by: HOSPITALIST

## 2025-02-23 PROCEDURE — 770020 HCHG ROOM/CARE - TELE (206)

## 2025-02-23 PROCEDURE — 82962 GLUCOSE BLOOD TEST: CPT | Mod: 91

## 2025-02-23 PROCEDURE — 80048 BASIC METABOLIC PNL TOTAL CA: CPT

## 2025-02-23 PROCEDURE — 94640 AIRWAY INHALATION TREATMENT: CPT

## 2025-02-23 PROCEDURE — 700102 HCHG RX REV CODE 250 W/ 637 OVERRIDE(OP): Performed by: STUDENT IN AN ORGANIZED HEALTH CARE EDUCATION/TRAINING PROGRAM

## 2025-02-23 PROCEDURE — 93005 ELECTROCARDIOGRAM TRACING: CPT | Mod: TC | Performed by: EMERGENCY MEDICINE

## 2025-02-23 PROCEDURE — 99223 1ST HOSP IP/OBS HIGH 75: CPT | Performed by: HOSPITALIST

## 2025-02-23 PROCEDURE — 700105 HCHG RX REV CODE 258: Performed by: HOSPITALIST

## 2025-02-23 PROCEDURE — 700105 HCHG RX REV CODE 258: Performed by: INTERNAL MEDICINE

## 2025-02-23 PROCEDURE — 700102 HCHG RX REV CODE 250 W/ 637 OVERRIDE(OP): Performed by: HOSPITALIST

## 2025-02-23 PROCEDURE — 99285 EMERGENCY DEPT VISIT HI MDM: CPT

## 2025-02-23 PROCEDURE — 85025 COMPLETE CBC W/AUTO DIFF WBC: CPT

## 2025-02-23 PROCEDURE — 96375 TX/PRO/DX INJ NEW DRUG ADDON: CPT

## 2025-02-23 PROCEDURE — 700101 HCHG RX REV CODE 250: Performed by: EMERGENCY MEDICINE

## 2025-02-23 PROCEDURE — 99222 1ST HOSP IP/OBS MODERATE 55: CPT | Performed by: INTERNAL MEDICINE

## 2025-02-23 PROCEDURE — 700111 HCHG RX REV CODE 636 W/ 250 OVERRIDE (IP): Mod: JZ | Performed by: NURSE PRACTITIONER

## 2025-02-23 PROCEDURE — 700111 HCHG RX REV CODE 636 W/ 250 OVERRIDE (IP): Mod: JZ | Performed by: EMERGENCY MEDICINE

## 2025-02-23 PROCEDURE — 96365 THER/PROPH/DIAG IV INF INIT: CPT

## 2025-02-23 PROCEDURE — 84484 ASSAY OF TROPONIN QUANT: CPT

## 2025-02-23 PROCEDURE — 96372 THER/PROPH/DIAG INJ SC/IM: CPT

## 2025-02-23 PROCEDURE — 700111 HCHG RX REV CODE 636 W/ 250 OVERRIDE (IP): Mod: JZ | Performed by: HOSPITALIST

## 2025-02-23 PROCEDURE — 99222 1ST HOSP IP/OBS MODERATE 55: CPT | Performed by: STUDENT IN AN ORGANIZED HEALTH CARE EDUCATION/TRAINING PROGRAM

## 2025-02-23 RX ORDER — DEXTROSE MONOHYDRATE 25 G/50ML
50 INJECTION, SOLUTION INTRAVENOUS ONCE
Status: COMPLETED | OUTPATIENT
Start: 2025-02-23 | End: 2025-02-23

## 2025-02-23 RX ORDER — INDOMETHACIN 25 MG/1
50 CAPSULE ORAL ONCE
Status: COMPLETED | OUTPATIENT
Start: 2025-02-23 | End: 2025-02-23

## 2025-02-23 RX ORDER — CALCIUM CHLORIDE 100 MG/ML
1 INJECTION INTRAVENOUS; INTRAVENTRICULAR ONCE
Status: COMPLETED | OUTPATIENT
Start: 2025-02-23 | End: 2025-02-23

## 2025-02-23 RX ORDER — CALCIUM GLUCONATE 20 MG/ML
1 INJECTION, SOLUTION INTRAVENOUS ONCE
Status: DISPENSED | OUTPATIENT
Start: 2025-02-23 | End: 2025-02-24

## 2025-02-23 RX ORDER — PROMETHAZINE HYDROCHLORIDE 25 MG/1
12.5-25 SUPPOSITORY RECTAL EVERY 4 HOURS PRN
Status: DISCONTINUED | OUTPATIENT
Start: 2025-02-23 | End: 2025-02-26 | Stop reason: HOSPADM

## 2025-02-23 RX ORDER — ASPIRIN 81 MG/1
81 TABLET ORAL
Status: DISCONTINUED | OUTPATIENT
Start: 2025-02-24 | End: 2025-02-26 | Stop reason: HOSPADM

## 2025-02-23 RX ORDER — LORAZEPAM 2 MG/ML
2 INJECTION INTRAMUSCULAR EVERY 4 HOURS PRN
Status: DISCONTINUED | OUTPATIENT
Start: 2025-02-23 | End: 2025-02-26 | Stop reason: HOSPADM

## 2025-02-23 RX ORDER — FUROSEMIDE 10 MG/ML
100 INJECTION INTRAMUSCULAR; INTRAVENOUS
Status: DISCONTINUED | OUTPATIENT
Start: 2025-02-23 | End: 2025-02-24

## 2025-02-23 RX ORDER — ARIPIPRAZOLE 2 MG/1
2 TABLET ORAL EVERY EVENING
Status: DISCONTINUED | OUTPATIENT
Start: 2025-02-23 | End: 2025-02-26 | Stop reason: HOSPADM

## 2025-02-23 RX ORDER — HALOPERIDOL 5 MG/ML
2.5 INJECTION INTRAMUSCULAR ONCE
Status: COMPLETED | OUTPATIENT
Start: 2025-02-23 | End: 2025-02-23

## 2025-02-23 RX ORDER — FUROSEMIDE 10 MG/ML
80 INJECTION INTRAMUSCULAR; INTRAVENOUS ONCE
Status: COMPLETED | OUTPATIENT
Start: 2025-02-23 | End: 2025-02-23

## 2025-02-23 RX ORDER — AMLODIPINE BESYLATE 10 MG/1
10 TABLET ORAL DAILY
Status: DISCONTINUED | OUTPATIENT
Start: 2025-02-23 | End: 2025-02-25

## 2025-02-23 RX ORDER — DEXTROSE MONOHYDRATE 25 G/50ML
25 INJECTION, SOLUTION INTRAVENOUS
Status: DISCONTINUED | OUTPATIENT
Start: 2025-02-23 | End: 2025-02-26 | Stop reason: HOSPADM

## 2025-02-23 RX ORDER — ALBUTEROL SULFATE 5 MG/ML
2.5 SOLUTION RESPIRATORY (INHALATION) ONCE
Status: COMPLETED | OUTPATIENT
Start: 2025-02-23 | End: 2025-02-23

## 2025-02-23 RX ORDER — SODIUM CHLORIDE 9 MG/ML
2000 INJECTION, SOLUTION INTRAVENOUS ONCE
Status: COMPLETED | OUTPATIENT
Start: 2025-02-23 | End: 2025-02-23

## 2025-02-23 RX ORDER — HEPARIN SODIUM 5000 [USP'U]/ML
5000 INJECTION, SOLUTION INTRAVENOUS; SUBCUTANEOUS EVERY 8 HOURS
Status: DISCONTINUED | OUTPATIENT
Start: 2025-02-23 | End: 2025-02-26 | Stop reason: HOSPADM

## 2025-02-23 RX ORDER — SODIUM BICARBONATE IN D5W 150/1000ML
PLASTIC BAG, INJECTION (ML) INTRAVENOUS CONTINUOUS
Status: DISPENSED | OUTPATIENT
Start: 2025-02-23 | End: 2025-02-23

## 2025-02-23 RX ORDER — PROCHLORPERAZINE EDISYLATE 5 MG/ML
5-10 INJECTION INTRAMUSCULAR; INTRAVENOUS EVERY 4 HOURS PRN
Status: DISCONTINUED | OUTPATIENT
Start: 2025-02-23 | End: 2025-02-26 | Stop reason: HOSPADM

## 2025-02-23 RX ORDER — ONDANSETRON 4 MG/1
4 TABLET, ORALLY DISINTEGRATING ORAL EVERY 4 HOURS PRN
Status: DISCONTINUED | OUTPATIENT
Start: 2025-02-23 | End: 2025-02-26 | Stop reason: HOSPADM

## 2025-02-23 RX ORDER — INSULIN LISPRO 100 [IU]/ML
2-9 INJECTION, SOLUTION INTRAVENOUS; SUBCUTANEOUS
Status: DISCONTINUED | OUTPATIENT
Start: 2025-02-23 | End: 2025-02-26 | Stop reason: HOSPADM

## 2025-02-23 RX ORDER — ONDANSETRON 2 MG/ML
4 INJECTION INTRAMUSCULAR; INTRAVENOUS EVERY 4 HOURS PRN
Status: DISCONTINUED | OUTPATIENT
Start: 2025-02-23 | End: 2025-02-26 | Stop reason: HOSPADM

## 2025-02-23 RX ORDER — METOPROLOL TARTRATE 25 MG/1
25 TABLET, FILM COATED ORAL 2 TIMES DAILY
Status: DISCONTINUED | OUTPATIENT
Start: 2025-02-23 | End: 2025-02-26 | Stop reason: HOSPADM

## 2025-02-23 RX ORDER — PROMETHAZINE HYDROCHLORIDE 25 MG/1
12.5-25 TABLET ORAL EVERY 4 HOURS PRN
Status: DISCONTINUED | OUTPATIENT
Start: 2025-02-23 | End: 2025-02-26 | Stop reason: HOSPADM

## 2025-02-23 RX ORDER — METOPROLOL TARTRATE 1 MG/ML
5 INJECTION, SOLUTION INTRAVENOUS ONCE
Status: COMPLETED | OUTPATIENT
Start: 2025-02-23 | End: 2025-02-23

## 2025-02-23 RX ORDER — LORAZEPAM 2 MG/ML
0.5 INJECTION INTRAMUSCULAR ONCE
Status: COMPLETED | OUTPATIENT
Start: 2025-02-23 | End: 2025-02-23

## 2025-02-23 RX ORDER — ACETAMINOPHEN 325 MG/1
650 TABLET ORAL EVERY 6 HOURS PRN
Status: DISCONTINUED | OUTPATIENT
Start: 2025-02-23 | End: 2025-02-26 | Stop reason: HOSPADM

## 2025-02-23 RX ORDER — ATORVASTATIN CALCIUM 10 MG/1
10 TABLET, FILM COATED ORAL DAILY
Status: DISCONTINUED | OUTPATIENT
Start: 2025-02-23 | End: 2025-02-26 | Stop reason: HOSPADM

## 2025-02-23 RX ADMIN — INSULIN LISPRO 2 UNITS: 100 INJECTION, SOLUTION INTRAVENOUS; SUBCUTANEOUS at 11:39

## 2025-02-23 RX ADMIN — ALBUTEROL SULFATE 2.5 MG: 2.5 SOLUTION RESPIRATORY (INHALATION) at 06:52

## 2025-02-23 RX ADMIN — METOPROLOL TARTRATE 5 MG: 5 INJECTION INTRAVENOUS at 05:35

## 2025-02-23 RX ADMIN — INSULIN GLARGINE-YFGN 15 UNITS: 100 INJECTION, SOLUTION SUBCUTANEOUS at 17:59

## 2025-02-23 RX ADMIN — HEPARIN SODIUM 5000 UNITS: 5000 INJECTION, SOLUTION INTRAVENOUS; SUBCUTANEOUS at 22:42

## 2025-02-23 RX ADMIN — LORAZEPAM 2 MG: 2 INJECTION INTRAMUSCULAR; INTRAVENOUS at 21:35

## 2025-02-23 RX ADMIN — SODIUM BICARBONATE 125 ML/HR: 84 INJECTION, SOLUTION INTRAVENOUS at 13:24

## 2025-02-23 RX ADMIN — HEPARIN SODIUM 5000 UNITS: 5000 INJECTION, SOLUTION INTRAVENOUS; SUBCUTANEOUS at 15:53

## 2025-02-23 RX ADMIN — AMLODIPINE BESYLATE 10 MG: 10 TABLET ORAL at 10:09

## 2025-02-23 RX ADMIN — CALCIUM CHLORIDE 1 G: 100 INJECTION, SOLUTION INTRAVENOUS at 06:32

## 2025-02-23 RX ADMIN — METOPROLOL TARTRATE 25 MG: 25 TABLET, FILM COATED ORAL at 10:31

## 2025-02-23 RX ADMIN — HALOPERIDOL LACTATE 2.5 MG: 5 INJECTION, SOLUTION INTRAMUSCULAR at 23:35

## 2025-02-23 RX ADMIN — FUROSEMIDE 80 MG: 10 INJECTION, SOLUTION INTRAVENOUS at 09:38

## 2025-02-23 RX ADMIN — SODIUM BICARBONATE 50 MEQ: 84 INJECTION INTRAVENOUS at 06:33

## 2025-02-23 RX ADMIN — INSULIN HUMAN 10 UNITS: 100 INJECTION, SOLUTION PARENTERAL at 06:35

## 2025-02-23 RX ADMIN — SODIUM CHLORIDE 2000 ML: 9 INJECTION, SOLUTION INTRAVENOUS at 09:40

## 2025-02-23 RX ADMIN — DEXTROSE MONOHYDRATE 50 ML: 25 INJECTION, SOLUTION INTRAVENOUS at 06:34

## 2025-02-23 RX ADMIN — HEPARIN SODIUM 5000 UNITS: 5000 INJECTION, SOLUTION INTRAVENOUS; SUBCUTANEOUS at 10:32

## 2025-02-23 RX ADMIN — LORAZEPAM 0.5 MG: 2 INJECTION INTRAMUSCULAR; INTRAVENOUS at 08:21

## 2025-02-23 RX ADMIN — ATORVASTATIN CALCIUM 10 MG: 10 TABLET, FILM COATED ORAL at 10:08

## 2025-02-23 RX ADMIN — METOPROLOL TARTRATE 25 MG: 25 TABLET, FILM COATED ORAL at 18:06

## 2025-02-23 RX ADMIN — SODIUM ZIRCONIUM CYCLOSILICATE 10 G: 10 POWDER, FOR SUSPENSION ORAL at 13:34

## 2025-02-23 RX ADMIN — ARIPIPRAZOLE 2 MG: 2 TABLET ORAL at 18:37

## 2025-02-23 RX ADMIN — FUROSEMIDE 100 MG: 10 INJECTION, SOLUTION INTRAVENOUS at 15:54

## 2025-02-23 ASSESSMENT — SOCIAL DETERMINANTS OF HEALTH (SDOH)
WITHIN THE LAST YEAR, HAVE YOU BEEN HUMILIATED OR EMOTIONALLY ABUSED IN OTHER WAYS BY YOUR PARTNER OR EX-PARTNER?: NO
IN THE PAST 12 MONTHS, HAS THE ELECTRIC, GAS, OIL, OR WATER COMPANY THREATENED TO SHUT OFF SERVICE IN YOUR HOME?: NO
WITHIN THE LAST YEAR, HAVE YOU BEEN KICKED, HIT, SLAPPED, OR OTHERWISE PHYSICALLY HURT BY YOUR PARTNER OR EX-PARTNER?: NO
WITHIN THE PAST 12 MONTHS, YOU WORRIED THAT YOUR FOOD WOULD RUN OUT BEFORE YOU GOT THE MONEY TO BUY MORE: NEVER TRUE
WITHIN THE LAST YEAR, HAVE TO BEEN RAPED OR FORCED TO HAVE ANY KIND OF SEXUAL ACTIVITY BY YOUR PARTNER OR EX-PARTNER?: NO
WITHIN THE LAST YEAR, HAVE YOU BEEN AFRAID OF YOUR PARTNER OR EX-PARTNER?: NO
WITHIN THE PAST 12 MONTHS, THE FOOD YOU BOUGHT JUST DIDN'T LAST AND YOU DIDN'T HAVE MONEY TO GET MORE: NEVER TRUE

## 2025-02-23 ASSESSMENT — ENCOUNTER SYMPTOMS
HEADACHES: 0
NERVOUS/ANXIOUS: 1
COUGH: 0
SHORTNESS OF BREATH: 0
FEVER: 0
VOMITING: 0
CHILLS: 0
NAUSEA: 0
DIZZINESS: 0
DIARRHEA: 0
PALPITATIONS: 0
TREMORS: 0
BLURRED VISION: 1
LOSS OF CONSCIOUSNESS: 0
HALLUCINATIONS: 1
ABDOMINAL PAIN: 0
BACK PAIN: 0

## 2025-02-23 ASSESSMENT — PATIENT HEALTH QUESTIONNAIRE - PHQ9
SUM OF ALL RESPONSES TO PHQ9 QUESTIONS 1 AND 2: 0
2. FEELING DOWN, DEPRESSED, IRRITABLE, OR HOPELESS: NOT AT ALL
1. LITTLE INTEREST OR PLEASURE IN DOING THINGS: NOT AT ALL

## 2025-02-23 ASSESSMENT — LIFESTYLE VARIABLES
TOTAL SCORE: 0
TOTAL SCORE: 0
HAVE YOU EVER FELT YOU SHOULD CUT DOWN ON YOUR DRINKING: NO
DOES PATIENT WANT TO STOP DRINKING: NO
HOW MANY TIMES IN THE PAST YEAR HAVE YOU HAD 5 OR MORE DRINKS IN A DAY: 0
TOTAL SCORE: 0
AVERAGE NUMBER OF DAYS PER WEEK YOU HAVE A DRINK CONTAINING ALCOHOL: 0
HAVE PEOPLE ANNOYED YOU BY CRITICIZING YOUR DRINKING: NO
EVER FELT BAD OR GUILTY ABOUT YOUR DRINKING: NO
ON A TYPICAL DAY WHEN YOU DRINK ALCOHOL HOW MANY DRINKS DO YOU HAVE: 0
CONSUMPTION TOTAL: NEGATIVE
EVER HAD A DRINK FIRST THING IN THE MORNING TO STEADY YOUR NERVES TO GET RID OF A HANGOVER: NO
ALCOHOL_USE: NO

## 2025-02-23 ASSESSMENT — COGNITIVE AND FUNCTIONAL STATUS - GENERAL
SUGGESTED CMS G CODE MODIFIER MOBILITY: CH
MOBILITY SCORE: 24
DAILY ACTIVITIY SCORE: 24
SUGGESTED CMS G CODE MODIFIER DAILY ACTIVITY: CH

## 2025-02-23 ASSESSMENT — COPD QUESTIONNAIRES
COPD SCREENING SCORE: 2
HAVE YOU SMOKED AT LEAST 100 CIGARETTES IN YOUR ENTIRE LIFE: NO/DON'T KNOW
DURING THE PAST 4 WEEKS HOW MUCH DID YOU FEEL SHORT OF BREATH: NONE/LITTLE OF THE TIME
DO YOU EVER COUGH UP ANY MUCUS OR PHLEGM?: NO/ONLY WITH OCCASIONAL COLDS OR INFECTIONS

## 2025-02-23 ASSESSMENT — FIBROSIS 4 INDEX
FIB4 SCORE: 1.23
FIB4 SCORE: 1.36

## 2025-02-23 NOTE — ASSESSMENT & PLAN NOTE
Patient describes hallucinations that predate this hospital stay.  He reports going back perhaps 6 months.  For the moment we will temporize with Ativan  Consult psychiatry  I have asked nursing and lab staff to try not to disturb the patient at night so that he may get 8 hours of restful sleep  He reports that he stopped using drugs 6 months ago    2/24 continue Abilify and melatonin per psych  I reviewed the psych notes    2/25/2025  No hallucinations this morning.  Continue Abilify and melatonin as per psychiatry recommendations.  I discussed with Dr. Christine Lagos, psychiatry resident.

## 2025-02-23 NOTE — ED NOTES
Patient resting comfortably with eyes closed, on heart monitor, chest rise and fall noted bilaterally. Bed low and locked, medicated, IV placed, phlebotomy collected labs.    No additional needs at this time.

## 2025-02-23 NOTE — ASSESSMENT & PLAN NOTE
Present 2/21, 2/22  Suspected T wave changes  S/p calcium, insulin, dextrose administration  Interval improvement in potassium  Repeat K in a.m.

## 2025-02-23 NOTE — DISCHARGE SUMMARY
Discharge Summary    CHIEF COMPLAINT ON ADMISSION  Chief Complaint   Patient presents with    Abdominal Pain     Intermittent x1 week    N/V     Intermittent x1 week       Reason for Admission  Other     Admission Date  2/20/2025    CODE STATUS  Prior    HPI & HOSPITAL COURSE  The patient is a 59-year-old male with a past medical history significant for hypertension, diabetes, hyperlipidemia, and CKD.  He presented to the emergency department on 2/20/2025 with abdominal pain, nausea, vomiting and general weakness.    In the emergency department, the patient was provided antiemetics and fluid resuscitation.  The patient underwent CT scan of his abdomen pelvis without contrast to preserve his kidney function.  He demonstrated gallstones with no cholecystitis nor other acute processes.  There was an elevated lipase level discovered.    The patient was initially admitted to the clinical decision unit.  On his first day of hospitalization, he reported feeling well with no nausea, vomiting, and abdominal pain.  Additionally, his renal function continue to improve with hydration and bicarbonate.  Seeing as the patient still required IV fluid resuscitation in order to achieve complete resolution of his presumed prerenal AMADO, the patient was admitted to the [facility] floor.  Renal ultrasound was pending on the day he elected to discharge AGAINST MEDICAL ADVICE.    While on [facility], the patient continued to deny abdominal pain.  He did have evidence of a elevated potassium level of 5.8.  An EKG was completed and showed evidence of T wave changes.  As such, the patient was transitioned to telemetry and provided calcium, insulin, and dextrose.  The patient's potassium was followed up on and ultimately down trended.  During this episode, the patient denied chest pain.  His troponins remained flat at 21.  An echocardiogram was ordered and pending on the day he elected to discharge AGAINST MEDICAL ADVICE.    The patient demonstrated  elevated blood pressures during his hospitalization.  Seeing as the patient was experiencing AMADO while with underlying history of CKD, his nephrotoxic antihypertensive agents were held.  He was provided amlodipine at higher dose and metoprolol was added to his regimen.  Unfortunately, we were unable to trend any meaningful improvement in his blood pressures as the patient elected to discharge AGAINST MEDICAL ADVICE.    On the day the patient elected to leave AGAINST MEDICAL ADVICE, the patient expressed concern that his employer would terminate his employment as a result of his hospital stay.  As such, I wrote a letter that was submitted as a progress note and informed the bedside RN to provide this letter to the patient in order to keep him in the hospital until he is medically cleared for discharge.  The patient's RN updated me that the patient was answering questions appropriately and elected to discharge AGAINST MEDICAL ADVICE.  Apparently, there is nothing that I would be able to provide in order to keep him in the hospital.  I offered to send his medications to his preferred pharmacy which was done prior to his discharge.    Therefore, he is discharged in guarded and stable condition against medcial advice.    Discharge Date  2/22/2025    FOLLOW UP ITEMS POST DISCHARGE  The patient is to follow-up with his primary care physician within 1 week of discharge.    The patient is to follow-up with his nephrologist as scheduled.    The patient is to return to the emergency department in the event he is unable to follow-up with his physicians.    DISCHARGE DIAGNOSES  Principal Problem:    AMADO (acute kidney injury) (HCC) (POA: Yes)  Active Problems:    Type 2 diabetes mellitus with hyperglycemia, without long-term current use of insulin (HCC) (POA: Yes)    Primary hypertension (POA: Yes)    Mixed hyperlipidemia (POA: Yes)    Hyponatremia (POA: Yes)    Metabolic acidosis (POA: Yes)    Dehydration (POA: Yes)     "Hyperkalemia (POA: Unknown)  Resolved Problems:    * No resolved hospital problems. *      FOLLOW UP  Future Appointments   Date Time Provider Department Center   6/18/2025 10:45 AM Fadi Najjar, M.D. NE[facility].     No follow-up provider specified.    MEDICATIONS ON DISCHARGE     Medication List        START taking these medications        Instructions   metoprolol tartrate 25 MG Tabs  Commonly known as: Lopressor   Take 1 Tablet by mouth 2 times a day for 90 days.  Dose: 25 mg            CHANGE how you take these medications        Instructions   amLODIPine 10 MG Tabs  What changed:   medication strength  how much to take  Commonly known as: Norvasc   Take 1 Tablet by mouth every day.  Dose: 10 mg     insulin GLARGINE 100 UNIT/ML Soln  What changed: how much to take  Commonly known as: LantusSemglee   Inject 15 Units under the skin every evening for 90 days.  Dose: 15 Units            CONTINUE taking these medications        Instructions   acetaminophen 325 MG Tabs  Commonly known as: Tylenol   Take 650 mg by mouth every 6 hours as needed for Mild Pain or Fever. 2 tablets = 650 mg.  Dose: 650 mg     aspirin 81 MG EC tablet   Take 81 mg by mouth every Monday, Wednesday, and Friday.  Dose: 81 mg     atorvastatin 10 MG Tabs  Commonly known as: Lipitor   Take 10 mg by mouth every day.  Dose: 10 mg     baclofen 10 MG Tabs  Commonly known as: Lioresal   Take 10 mg by mouth 3 times a day as needed (Muscle Spasms).  Dose: 10 mg     Cialis 5 MG tablet  Generic drug: tadalafil   Take 5 mg by mouth 1 time a day as needed for Erectile Dysfunction (one hour prior to sexual activity). Not to exceed 1 tablet (5 mg) within 24 hours.  Dose: 5 mg     Guaiasorb DM  MG/5ML Liqd soln  Generic drug: guaifenesin dextromethorphan sugar free   Take 10 mL by mouth every four hours as needed for Cough.  Dose: 10 mL     Non Formulary Request   Take 1 Packet by mouth every 6 hours as needed (Cough, Mild Pain or Fever). \"Theraflu Flu " "Relief Max Strength Nighttime\"  1 packet contains 1,000 mg acetaminophen, 4 mg chlorpheniramine, 30 mg dextromethorphan  Dose: 1 Packet     Ozempic (0.25 or 0.5 MG/DOSE) 2 MG/3ML Sopn  Generic drug: Semaglutide(0.25 or 0.5MG/DOS)   Inject 0.5 mg under the skin every 7 days.  Dose: 0.5 mg     pseudoephedrine 60 MG Tabs  Commonly known as: Sudafed   Take 60 mg by mouth every four hours as needed for Congestion. Not to exceed 4 tablets within 24 hours.  Dose: 60 mg            STOP taking these medications      glimepiride 2 MG Tabs  Commonly known as: Amaryl     hydroCHLOROthiazide 25 MG Tabs     Jardiance 10 MG Tabs tablet  Generic drug: Empagliflozin     ketoconazole 2 % Crea  Commonly known as: Nizoral     lisinopril 40 MG tablet  Commonly known as: Prinivil     pioglitazone 15 MG Tabs  Commonly known as: Actos     terbinafine 250 MG Tabs  Commonly known as: LamISIL              Allergies  Allergies   Allergen Reactions    Penicillins Unspecified     Patient unsure of reaction       DIET  No orders of the defined types were placed in this encounter.      ACTIVITY  As tolerated.  Weight bearing as tolerated    CONSULTATIONS  None    PROCEDURES  None    LABORATORY  Lab Results   Component Value Date    SODIUM 135 02/22/2025    POTASSIUM 5.3 02/22/2025    CHLORIDE 105 02/22/2025    CO2 21 02/22/2025    GLUCOSE 139 (H) 02/22/2025    BUN 50 (H) 02/22/2025    CREATININE 2.07 (H) 02/22/2025        Lab Results   Component Value Date    WBC 7.9 02/23/2025    HEMOGLOBIN 14.2 02/23/2025    HEMATOCRIT 41.2 (L) 02/23/2025    PLATELETCT 222 02/23/2025      CT-ABDOMEN-PELVIS W/O   Final Result      1.  No urolithiasis or hydronephrosis.   2.  No acute inflammatory abnormality.   3.  Cholelithiasis.   4.  Atherosclerosis.   5.  Circumscribed lesion in the subcutaneous left gluteal soft tissues as described above.            Total time of the discharge process exceeds 35 minutes.  "

## 2025-02-23 NOTE — ED NOTES
Bedside report received from off going RN Ayse, assumed care of patient.  POC discussed with patient. Call light within reach, all needs addressed at this time.       Fall risk interventions in place: Move the patient closer to the nurse's station, Patient's personal possessions are with in their safe reach, Place fall risk sign on patient's door, and Keep floor surfaces clean and dry (all applicable per [place] Fall risk assessment)   Continuous monitoring: Cardiac Leads, Pulse Ox, or Blood Pressure  IVF/IV medications: Not Applicable   Oxygen: Room Air  Bedside sitter: Not Applicable   Isolation: Not Applicable

## 2025-02-23 NOTE — ASSESSMENT & PLAN NOTE
Patient is on semaglutide and 15 units of Lantus as an outpatient  Last A1c was 5.52 days ago  We will cover the sliding scale for the moment  Monitor blood glucose  Hypoglycemia protocol in place  Given his renal function and SGLT2i would be appropriate    2/25/2025  Decrease glargine insulin to 12 units from 15 units daily due to hypoglycemia.  Blood glucose goal 140-180.  Continue lispro insulin sliding scale  Hypoglycemia protocol  Diabetic renal diet

## 2025-02-23 NOTE — ED NOTES
This RN notified by pt's brother that pt disconnected his IV.  IV reconnected and pt educated not to mess with medical equipment and to ask staff for assistance.  Pt verbalized understanding.

## 2025-02-23 NOTE — PROGRESS NOTES
4 Eyes Skin Assessment Completed by Gurpreet Barajas, RN and Noah Araujo RN.    Head WDL  Ears WDL  Nose WDL  Mouth WDL  Neck WDL  Breast/Chest WDL  Shoulder Blades WDL  Spine WDL  (R) Arm/Elbow/Hand WDL  (L) Arm/Elbow/Hand WDL  Abdomen WDL  Groin WDL  Scrotum/Coccyx/Buttocks WDL  (R) Leg WDL  (L) Leg WDL  (R) Heel/Foot/Toe WDL  (L) Heel/Foot/Toe WDL          Devices In Places Tele Box      Interventions In Place Pillows    Possible Skin Injury No    Pictures Uploaded Into Epic N/A  Wound Consult Placed N/A  RN Wound Prevention Protocol Ordered No

## 2025-02-23 NOTE — ED NOTES
Med Rec complete per historical   Allergies reviewed-  Antibiotics in the past 30 days:no  Anticoagulant in past 14 days:no  Pharmacy patient utilizes:[facility]    Pt reports not taking any of his medications since being home    Per historical following medications discontinued (not on med rec): Glimepiride 2 mg (last dose: 2/18/25); HCTZ 25 mg (last dose: 2/18/25); Jardiance 10 mg (last dose: 2/18/25); Ketoconazole 2% cream (last dose 2-19/25); Lisinopril 40 mg (last dose: 2/18/25); Actos 15 mg (last dose: 2/18/25); Terbinafine 250 mg  (last dose: 2/15/25)

## 2025-02-23 NOTE — ED NOTES
Pt medicated per MAR.  Per pt's brother, pt remains paranoid that someone is coming to get him.  Pt appears anxious and continues to pull off medical equipment. Bed alarm placed under pt.

## 2025-02-23 NOTE — H&P
Hospital Medicine History & Physical Note    Date of Service  2/23/2025    Primary Care Physician  Venkatesh Long M.D.    Consultants  nephrology and psychiatry    Specialist Names: Safdi    Code Status  Full Code    Chief Complaint  Chief Complaint   Patient presents with    Hallucinations     X yesterday afternoon        History of Presenting Illness  Juan Almaguer is a 59 y.o. male who presented 2/23/2025 with history of type 2 diabetes, hypertension, dyslipidemia, hyperkalemia, distant history of stimulant abuse.    Patient was just in the hospital having been admitted for acute on chronic kidney disease and hyperkalemia.  He left yesterday morning, after having had hallucinations of rain inside his room and people outside the ivory trying to get him.  He reports he was unable to sleep for the 48 hours he was here.  He comes back today with his brother, stating that he knows he needs to be in the hospital and is going to try and stay.    On presentation initial labs show potassium of 7.4 CO2 of 20 glucose 102 creatinine 2.28 BUN 51.  After treatment for hyperkalemia, repeat labs shows potassium of 5.2 with creatinine 2.14.  Patient is being admitted for treatment of hyperkalemia, acute on chronic kidney disease further evaluation of his hallucinations    I discussed the plan of care with patient and family.    Review of Systems  Review of Systems   Constitutional:  Negative for chills and fever.   Respiratory:  Negative for cough and shortness of breath.    Cardiovascular:  Negative for chest pain, palpitations and leg swelling.   Gastrointestinal:  Negative for abdominal pain, diarrhea, nausea and vomiting.   Genitourinary:  Negative for dysuria and urgency.   Musculoskeletal:  Negative for back pain.   Skin:  Negative for rash.   Neurological:  Negative for dizziness, loss of consciousness and headaches.   Psychiatric/Behavioral:  Positive for hallucinations. The patient is nervous/anxious.         Past Medical History   has a past medical history of Diabetes (HCC).    Surgical History   has no past surgical history on file.     Family History  family history is not on file.   Family history reviewed with patient. There is no family history that is pertinent to the chief complaint.     Social History   reports that he has never smoked. He has never used smokeless tobacco. He reports that he does not currently use alcohol. He reports that he does not currently use drugs.    Allergies  Allergies   Allergen Reactions    Penicillins Unspecified     Patient unsure of reaction       Medications  Prior to Admission Medications   Prescriptions Last Dose Informant Patient Reported? Taking?   acetaminophen (TYLENOL) 325 MG Tab 2/18/2025 Historical Yes No   Sig: Take 650 mg by mouth every 6 hours as needed for Mild Pain or Fever. 2 tablets = 650 mg.   amLODIPine (NORVASC) 10 MG Tab New Rx Historical No No   Sig: Take 1 Tablet by mouth every day.   aspirin 81 MG EC tablet 2/17/2025 Historical Yes No   Sig: Take 81 mg by mouth every Monday, Wednesday, and Friday.   atorvastatin (LIPITOR) 10 MG Tab 2/18/2025 Historical Yes No   Sig: Take 10 mg by mouth every day.   insulin GLARGINE (LANTUS,SEMGLEE) 100 UNIT/ML Solution New Rx Historical No No   Sig: Inject 15 Units under the skin every evening for 90 days.   metoprolol tartrate (LOPRESSOR) 25 MG Tab New Rx Historical No No   Sig: Take 1 Tablet by mouth 2 times a day for 90 days.      Facility-Administered Medications: None       Physical Exam  Temp:  [37 °C (98.6 °F)] 37 °C (98.6 °F)  Pulse:  [] 97  Resp:  [13-26] 20  BP: (186-218)/() 192/97  SpO2:  [90 %-98 %] 96 %  Blood Pressure: (!) 192/97   Temperature: 37 °C (98.6 °F)   Pulse: 97   Respiration: 20   Pulse Oximetry: 96 %       Physical Exam  Constitutional:       General: He is not in acute distress.     Appearance: He is well-developed. He is not diaphoretic.   HENT:      Head: Normocephalic and  atraumatic.   Eyes:      Conjunctiva/sclera: Conjunctivae normal.   Neck:      Vascular: No JVD.   Cardiovascular:      Rate and Rhythm: Normal rate.      Heart sounds: No murmur heard.     No gallop.   Pulmonary:      Effort: Pulmonary effort is normal. No respiratory distress.      Breath sounds: No stridor. No wheezing or rales.   Abdominal:      Palpations: Abdomen is soft.      Tenderness: There is no abdominal tenderness. There is no guarding or rebound.   Skin:     General: Skin is warm and dry.      Findings: No rash.   Neurological:      Mental Status: He is oriented to person, place, and time.   Psychiatric:         Mood and Affect: Mood is anxious.         Behavior: Behavior is not agitated.         Thought Content: Thought content is paranoid.      Comments: Patient admits to visual and auditory hallucinations.  He does not specify what voices he is hearing.  Describes seeing water and rain coming down from the roof while in the hospital.  Brother reports that patient will tell him that somebody is talking in the hallway about him when nobody is there.         Laboratory:  Recent Labs     02/21/25  0106 02/22/25  0559 02/23/25  0530   WBC 6.6 8.8 7.9   RBC 3.93* 4.35* 4.68*   HEMOGLOBIN 11.9* 13.1* 14.2   HEMATOCRIT 35.0* 38.7* 41.2*   MCV 89.1 89.0 88.0   MCH 30.3 30.1 30.3   MCHC 34.0 33.9 34.5   RDW 43.9 42.6 42.6   PLATELETCT 190 202 222   MPV 12.0 11.4 11.3     Recent Labs     02/22/25  1211 02/23/25  0530 02/23/25  0745   SODIUM 135 133* 135   POTASSIUM 5.3 7.4* 5.2   CHLORIDE 105 101 102   CO2 21 20 21   GLUCOSE 139* 102* 131*   BUN 50* 51* 48*   CREATININE 2.07* 2.28* 2.14*   CALCIUM 9.5 9.3 10.6*     Recent Labs     02/21/25  0106 02/22/25  0559 02/22/25  1211 02/23/25  0530 02/23/25  0745   ALTSGPT 24  --   --   --   --    ASTSGOT 22  --   --   --   --    ALKPHOSPHAT 77  --   --   --   --    TBILIRUBIN 0.3  --   --   --   --    GLUCOSE 129*   < > 139* 102* 131*    < > = values in this interval  "not displayed.         No results for input(s): \"NTPROBNP\" in the last 72 hours.      Recent Labs     02/22/25  1211 02/22/25  1608 02/23/25  0530   TROPONINT 21* 21* 28*       Imaging:  No orders to display       EKG:  I have personally reviewed the images and compared with prior images.    Assessment/Plan:  Justification for Admission Status  I anticipate this patient will require at least two midnights for appropriate medical management, necessitating inpatient admission because hyperkalemia    Patient will need a Telemetry bed on MEDICAL service .  The need is secondary to hyperkalemia.    * Hyperkalemia- (present on admission)  Assessment & Plan  Patient presents with hyperkalemia due to his acute on chronic kidney injury  He has been treated with the hyperkalemia protocol here in the ED and his initial K of 7.4 is now down to 5.2  We will bolus 2 L of normal saline, and give 80 mg IV of Lasix  Every 4 BMPs  Admit to telemetry  Consult nephrology  Start [facility]    Hallucinations  Assessment & Plan  Patient describes hallucinations that predate this hospital stay.  He reports going back perhaps 6 months.  For the moment we will temporize with Ativan  Consult psychiatry  I have asked nursing and lab staff to try not to disturb the patient at night so that he may get 8 hours of restful sleep  He reports that he stopped using drugs 6 months ago    Mixed hyperlipidemia- (present on admission)  Assessment & Plan  Statin    Primary hypertension- (present on admission)  Assessment & Plan  Continue metoprolol 25 mg twice daily, and amlodipine 10 mg  Monitor and titrate    Type 2 diabetes mellitus with hyperglycemia, without long-term current use of insulin (HCC)- (present on admission)  Assessment & Plan  Patient is on semaglutide and 15 units of Lantus as an outpatient  Last A1c was 5.52 days ago  We will cover the sliding scale for the moment  Monitor blood glucose  Hypoglycemia protocol in place  Given his renal " function and SGLT2i would be appropriate    AMADO (acute kidney injury) (HCC)- (present on admission)  Assessment & Plan  Acute on probable chronic kidney disease  Consult nephrology  Daily BMP  Renally dose medications as appropriate        VTE prophylaxis: heparin ppx

## 2025-02-23 NOTE — ED PROVIDER NOTES
ED Provider Note    CHIEF COMPLAINT  Chief Complaint   Patient presents with    Hallucinations     X yesterday afternoon        EXTERNAL RECORDS REVIEWED  Inpatient Notes ED evaluation for abdominal pain, nausea or vomiting.  Workup with AMADO, elevated lipase and admitted to the hospital for further evaluation.  History significant for hypertension, diabetes, dyslipidemia and CKD.  Provided antiemetic and fluid resuscitation.  CT of the abdomen pelvis without contrast to preserve kidney function demonstrated gallstones without cholecystitis or other acute process.  Elevated lipase discomfort.  Admitted to CDU patient still required IV resuscitation and to achieve complete resolution of his prezoomed prerenal AM[facility].  Renal ultrasound was pending at time of leaving AGAINST MEDICAL ADVICE.  While on MedSurg patient denied any abdominal pain, he did have elevated potassium at 5.8, EKG with T wave changes.  Patient transition to telemetry provided calcium, insulin, dextrose.  Potassium was followed up on and ultimately down trended troponins remained flat at 21.  Echocardiogram pending before patient left AGAINST MEDICAL ADVICE.    HPI/ROS  LIMITATION TO HISTORY   Select: : None  OUTSIDE HISTORIAN(S):  Family Brother    Juan Almaguer is a 59 y.o. male who presents to the emergency department through triage for evaluation after leaving the hospital AGAINST MEDICAL ADVICE.  Patient states he was here for a couple of days, encouraged to stay longer due to elevated potassium levels and renal dysfunction but left last night after having some visual hallucination.  Patient saw water dripping from the ceiling in his room, ringing in the room which she knew was not real.  Denies auditory hallucination.  Denies any plan to hurt himself or others.  Denies history of similar symptoms.    PAST MEDICAL HISTORY   has a past medical history of Diabetes (HCC).    SURGICAL HISTORY  patient denies any  "surgical history    FAMILY HISTORY  History reviewed. No pertinent family history.    SOCIAL HISTORY  Social History     Tobacco Use    Smoking status: Never    Smokeless tobacco: Never   Vaping Use    Vaping status: Never Used   Substance and Sexual Activity    Alcohol use: Not Currently    Drug use: Not Currently    Sexual activity: Not on file       CURRENT MEDICATIONS  Home Medications       Reviewed by Mike Murillo (Pharmacy Tech) on 02/23/25 at 0726  Med List Status: Complete     Medication Last Dose Status   acetaminophen (TYLENOL) 325 MG Tab 2/18/2025 Active   amLODIPine (NORVASC) 10 MG Tab New Rx Active   aspirin 81 MG EC tablet 2/17/2025 Active   atorvastatin (LIPITOR) 10 MG Tab 2/18/2025 Active   baclofen (LIORESAL) 10 MG Tab 2/17/2025 Active   guaifenesin dextromethorphan sugar free (GUAIASORB DM)  MG/5ML Liquid soln 2/20/2025 Active   insulin GLARGINE (LANTUS,SEMGLEE) 100 UNIT/ML Solution New Rx Active   metoprolol tartrate (LOPRESSOR) 25 MG Tab New Rx Active   Non Formulary Request 2/20/2025 Active   pseudoephedrine (SUDAFED) 60 MG Tab 2/18/2025 Active   Semaglutide,0.25 or 0.5MG/DOS, (OZEMPIC, 0.25 OR 0.5 MG/DOSE,) 2 MG/3ML Solution Pen-injector 2/15/2025 Active   tadalafil (CIALIS) 5 MG tablet 2/18/2025 Active                    ALLERGIES  Allergies   Allergen Reactions    Penicillins Unspecified     Patient unsure of reaction       PHYSICAL EXAM  VITAL SIGNS: BP (!) 211/118   Pulse (!) 102   Temp 37 °C (98.6 °F) (Temporal)   Resp 18   Ht 1.651 m (5' 5\")   Wt 81.3 kg (179 lb 3.7 oz)   SpO2 95%   BMI 29.83 kg/m²    Pulse ox interpretation: I interpret this pulse ox as normal.  Constitutional: Alert in no apparent distress.  HENT: Normocephalic, atraumatic. Bilateral external ears normal, Nose normal.  Slightly dry mucous membranes.    Eyes: Pupils are equal and reactive, Conjunctiva normal.   Neck: Normal range of motion, Supple  Lymphatic: No lymphadenopathy noted. "   Cardiovascular: Regular rate and rhythm, no murmurs. Distal pulses intact.  No peripheral edema.  Thorax & Lungs: Normal breath sounds.  No wheezing/rales/ronchi. No increased work of breathing, clipped speech or retractions.   Abdomen: Soft, non-distended, non-tender to palpation. No palpable or pulsatile masses.   Skin: Warm, Dry, No erythema, No rash.   Musculoskeletal: Good range of motion in all major joints.   Neurologic: Alert and orient x 4.  Speech clear and cohesive.  Moves 4 extremity spontaneously.  Psychiatric: Anxious otherwise affect normal.  Cooperative.      EKG/LABS  Results for orders placed or performed during the hospital encounter of 02/23/25   POCT glucose device results    Collection Time: 02/23/25  4:59 AM   Result Value Ref Range    POC Glucose, Blood 106 (H) 65 - 99 mg/dL   CBC WITH DIFFERENTIAL    Collection Time: 02/23/25  5:30 AM   Result Value Ref Range    WBC 7.9 4.8 - 10.8 K/uL    RBC 4.68 (L) 4.70 - 6.10 M/uL    Hemoglobin 14.2 14.0 - 18.0 g/dL    Hematocrit 41.2 (L) 42.0 - 52.0 %    MCV 88.0 81.4 - 97.8 fL    MCH 30.3 27.0 - 33.0 pg    MCHC 34.5 32.3 - 36.5 g/dL    RDW 42.6 35.9 - 50.0 fL    Platelet Count 222 164 - 446 K/uL    MPV 11.3 9.0 - 12.9 fL    Neutrophils-Polys 72.90 (H) 44.00 - 72.00 %    Lymphocytes 18.70 (L) 22.00 - 41.00 %    Monocytes 6.80 0.00 - 13.40 %    Eosinophils 0.80 0.00 - 6.90 %    Basophils 0.50 0.00 - 1.80 %    Immature Granulocytes 0.30 0.00 - 0.90 %    Nucleated RBC 0.00 0.00 - 0.20 /100 WBC    Neutrophils (Absolute) 5.73 1.82 - 7.42 K/uL    Lymphs (Absolute) 1.47 1.00 - 4.80 K/uL    Monos (Absolute) 0.53 0.00 - 0.85 K/uL    Eos (Absolute) 0.06 0.00 - 0.51 K/uL    Baso (Absolute) 0.04 0.00 - 0.12 K/uL    Immature Granulocytes (abs) 0.02 0.00 - 0.11 K/uL    NRBC (Absolute) 0.00 K/uL   BASIC METABOLIC PANEL    Collection Time: 02/23/25  5:30 AM   Result Value Ref Range    Sodium 133 (L) 135 - 145 mmol/L    Potassium 7.4 (HH) 3.6 - 5.5 mmol/L    Chloride  101 96 - 112 mmol/L    Co2 20 20 - 33 mmol/L    Glucose 102 (H) 65 - 99 mg/dL    Bun 51 (H) 8 - 22 mg/dL    Creatinine 2.28 (H) 0.50 - 1.40 mg/dL    Calcium 9.3 8.5 - 10.5 mg/dL    Anion Gap 12.0 7.0 - 16.0   TROPONIN    Collection Time: 25  5:30 AM   Result Value Ref Range    Troponin T 28 (H) 6 - 19 ng/L   ESTIMATED GFR    Collection Time: 25  5:30 AM   Result Value Ref Range    GFR (CKD-EPI) 32 (A) >60 mL/min/1.73 m 2   POCT glucose device results    Collection Time: 25  6:31 AM   Result Value Ref Range    POC Glucose, Blood 99 65 - 99 mg/dL   POCT glucose device results    Collection Time: 25  6:51 AM   Result Value Ref Range    POC Glucose, Blood 214 (H) 65 - 99 mg/dL   POCT glucose device results    Collection Time: 25  7:40 AM   Result Value Ref Range    POC Glucose, Blood 119 (H) 65 - 99 mg/dL   BMP    Collection Time: 25  7:45 AM   Result Value Ref Range    Sodium 135 135 - 145 mmol/L    Potassium 5.2 3.6 - 5.5 mmol/L    Chloride 102 96 - 112 mmol/L    Co2 21 20 - 33 mmol/L    Glucose 131 (H) 65 - 99 mg/dL    Bun 48 (H) 8 - 22 mg/dL    Creatinine 2.14 (H) 0.50 - 1.40 mg/dL    Calcium 10.6 (H) 8.5 - 10.5 mg/dL    Anion Gap 12.0 7.0 - 16.0   ESTIMATED GFR    Collection Time: 25  7:45 AM   Result Value Ref Range    GFR (CKD-EPI) 34 (A) >60 mL/min/1.73 m 2   EKG (NOW)    Collection Time: 25  8:34 AM   Result Value Ref Range    Report       [facility] Emergency Dept.    Test Date:  2025  Pt Name:    TATY WEINBERG             Department: ER  MRN:        [id number]                      Room:       [facility]  Gender:     Male                         Technician: [id number]  :        1965                   Requested By:DERIK PAUL  Order #:    [id number]                    Reading MD: DERIK PAUL, DO    Measurements  Intervals                                Axis  Rate:       69                           P:          -15  NH:         141                           QRS:        -15  QRSD:       111                          T:          46  QT:         395  QTc:        423    Interpretive Statements  Sinus rhythm  Borderline left axis deviation  Borderline ST elevation, anterior leads  Compared to ECG 2025 13:30:31  No significant changes  Electronically Signed On 2025 08:34:08 PST by DERIK PAUL DO     EKG    Collection Time: 25  8:34 AM   Result Value Ref Range    Report       [facility] Emergency Dept.    Test Date:  2025  Pt Name:    TATY WEINBERG             Department: ER  MRN:        [id number]                      Room:       [facility]  Gender:     Male                         Technician: [id number]  :        1965                   Requested By:DERIK PAUL  Order #:    [id number]                    Reading MD: DERIK PAUL DO    Measurements  Intervals                                Axis  Rate:       79                           P:          14  MD:         131                          QRS:        -11  QRSD:       116                          T:          43  QT:         372  QTc:        427    Interpretive Statements  Sinus rhythm  Incomplete left bundle branch block  Probable left ventricular hypertrophy  Anterior ST elevation, probably due to LVH  Compared to ECG 2025 05:23:34  Left bundle-branch block now present  Left ventricular hypertrophy now present  ST (T wave) deviation still present  Electronically Sig marilou On 2025 08:34:09 PST by DERIK PAUL DO         I have independently interpreted this EKG      COURSE & MEDICAL DECISION MAKING    ASSESSMENT, COURSE AND PLAN  Care Narrative:   Seen evaluated at bedside.  Returned after leaving AMA last night for some visual hallucinations.  Redirected back to the emergency department with family.  Patient describes history of hypertension, now with some kidney disease and elevated potassium requiring prolonged admission last  time.  Patient left after having some visual hallucinations after not sleeping for a couple of days.  Denies chest pain or shortness of breath.  Will repeat labs, EKG and reassess      6:19 AM  Potassium 7.4, creatinine 2.28 without other electrolyte derangement.  Add dextrose, insulin, sodium bicarbonate, albuterol.  Discussion with hospitalist, okay for telemetry if without ectopy/arrhythmia and repeat labs with improved potassium.  Recommends calcium gluconate as well despite normal EKG.  Plan telemetry admission but will require repeat labs first.    0635 -patient reevaluated at bedside.  EKG with diffusely peaked T waves, initial EKG had not been signed, also with peaked T waves.  Meds being administered at this time.  Patient mentation intact, aware of the findings and agreeable to intervention and hospitalization.  Brother remains at bedside.  Hypertensive but hemodynamically stable otherwise.  Will repeat BMP at 0730.    0730 -patient reevaluated at bedside.  Telemetry with decreased peaked T waves.  Patient is anxious, somewhat agitated by have some visual hallucination. Redirectable and cooperative with reassurance.  Add 0.5 mg of Ativan.  Denies history of the same although her previous hospitalization with couple of days of no sleep, suspect some hospital-based delirium.  Cannot exclude some uremic encephalopathy.  Cooperative and redirectable otherwise.  Family remains at bedside.    8:31 AM repeat potassium 5.2.  Proceed with admission to telemetry.    ADDITIONAL PROBLEMS MANAGED  Uncontrolled hypertension    DISPOSITION AND DISCUSSIONS  I have discussed management of the patient with the following physicians and SID's:    8:52 AM Dr. Blum is aware of the patient agreeable to consultation.    CRITICAL CARE  The very real possibilty of a deterioration of this patient's condition required the highest level of my preparedness for sudden, emergent intervention.  I provided critical care services, which  included medication orders, frequent reevaluations of the patient's condition and response to treatment, ordering and reviewing test results, and discussing the case with various consultants.  The critical care time associated with the care of the patient was 35 minutes. Review chart for interventions. This time is exclusive of any other billable procedures.       FINAL DIAGNOSIS  1. Hyperkalemia    2. Acute renal failure superimposed on chronic kidney disease, unspecified acute renal failure type, unspecified CKD stage (HCC)    3. Visual hallucinations    4. Uncontrolled hypertension         Electronically signed by: Isabella Gomes D.O., 2/23/2025 5:21 AM

## 2025-02-23 NOTE — ED TRIAGE NOTES
"59 y.o. male Juan Almaguer  Chief Complaint   Patient presents with    Hallucinations     X yesterday afternoon    Patient presented to ED with complaints of visual hallucination since yesterday, patient reported, he sees like roof is leaking and its raining inside the room. As per patient he got AMA yesterday from hospital, after being treated for hyperkalemia.    Last K 5.3 02/22/25    Patient AAO X4 , Respirations even and unlabored on room air , afebrile at this time.     BP (!) 199/110   Pulse 83   Temp 37 °C (98.6 °F) (Temporal)   Resp 18   Ht 1.651 m (5' 5\")   Wt 81.3 kg (179 lb 3.7 oz)   SpO2 98%   BMI 29.83 kg/m²     Allergies   Allergen Reactions    Penicillins Unspecified     Patient unsure of reaction     Past Medical History:   Diagnosis Date    Diabetes (HCC)      History reviewed. No pertinent surgical history.    Pt made aware of triage process, placed back into lobby, educated pt to tell staff of any worsening of symptoms       "

## 2025-02-23 NOTE — ASSESSMENT & PLAN NOTE
Patient presents with hyperkalemia due to his acute on chronic kidney injury  He has been treated with the hyperkalemia protocol here in the ED and his initial K of 7.4 is now down to 5.2  We will bolus 2 L of normal saline, and give 80 mg IV of Lasix  Nephrology consulted    2/24 K 7>5>4.4  Continue Lokelma  Continue to monitor    2/25/2025  Potassium stable at 4.5.  Continuing on Lokelma.  Continues cardiac monitoring to monitor for arrhythmias.

## 2025-02-23 NOTE — ASSESSMENT & PLAN NOTE
Continue metoprolol 25 mg twice daily, and amlodipine 10 mg  Monitor and titrate    2/25/2025  Controlled.  Continue metoprolol 25 mg about twice daily and amlodipine 10 mg daily

## 2025-02-23 NOTE — ED NOTES
Assist RN    EKG done, tracing seen by Erp  IV insertion done.   IV medications given, pls see MAR

## 2025-02-23 NOTE — CONSULTS
Nephrology Consultation    Date of Service  2/23/2025    Referring Physician  Haroon Castellanos, *    Consulting Physician  Wilber Partida M.D.    Reason for Consultation  AMADO on CKD3, hyperkalemia    History of Presenting Illness  59 y.o. male with over 30-year history of diabetes, CKD 3 who presented 2/23/2025 with visual hallucinations.    This patient was recently admitted to the hospital, and left AGAINST MEDICAL ADVICE yesterday.  On his previous admission that ended yesterday, he was admitted for abdominal pain, nausea, vomiting.  Patient was noted to have AMADO during that admission, for which she was given IV fluids, with improvement.  He said he presented back to the hospital this morning due to visual hallucinations.    On my evaluation today, he denies visual hallucinations.  He denies lower urinary tract symptoms.  He denies taking NSAIDs.  He says he has had diabetes for over 30 years, but has never seen an ophthalmologist, but does complain of blurry vision.    Review of Systems  Review of Systems   Constitutional:  Negative for fever.   Eyes:  Positive for blurred vision.   Respiratory:  Negative for shortness of breath.    Cardiovascular:  Negative for chest pain.   Gastrointestinal:  Negative for abdominal pain.   Psychiatric/Behavioral:  Positive for hallucinations.    All other systems reviewed and are negative.      Past Medical History  Past Medical History:   Diagnosis Date    Diabetes (HCC)        Surgical History  History reviewed. No pertinent surgical history.    Family History  History reviewed. No pertinent family history.    Social History  Social History     Socioeconomic History    Marital status: Single     Spouse name: Not on file    Number of children: Not on file    Years of education: Not on file    Highest education level: Not on file   Occupational History    Not on file   Tobacco Use    Smoking status: Never    Smokeless tobacco: Never   Vaping Use    Vaping status: Never Used    Substance and Sexual Activity    Alcohol use: Not Currently    Drug use: Not Currently    Sexual activity: Not on file   Other Topics Concern    Not on file   Social History Narrative    Not on file     Social Drivers of Health     Financial Resource Strain: Not on file   Food Insecurity: Food Insecurity Present (2/20/2025)    Hunger Vital Sign     Worried About Running Out of Food in the Last Year: Sometimes true     Ran Out of Food in the Last Year: Sometimes true   Transportation Needs: No Transportation Needs (2/20/2025)    PRAPARE - Transportation     Lack of Transportation (Medical): No     Lack of Transportation (Non-Medical): No   Physical Activity: Not on file   Stress: Not on file   Social Connections: Not on file   Intimate Partner Violence: Not At Risk (2/20/2025)    Humiliation, Afraid, Rape, and Kick questionnaire     Fear of Current or Ex-Partner: No     Emotionally Abused: No     Physically Abused: No     Sexually Abused: No   Housing Stability: High Risk (2/20/2025)    Housing Stability Vital Sign     Unable to Pay for Housing in the Last Year: No     Number of Times Moved in the Last Year: 1     Homeless in the Last Year: Yes       Medications  Current Facility-Administered Medications   Medication Dose Route Frequency Provider Last Rate Last Admin    calcium GLUConate 1 g in NaCl IVPB premix  1 g Intravenous Once Isabella Gomes D.O.   Held at 02/23/25 0645    sodium zirconium cyclosilicate (Lokelma) packet 10 g  10 g Oral DAILY AT NOON Haroon Castellanos D.O.   10 g at 02/23/25 1334    acetaminophen (Tylenol) tablet 650 mg  650 mg Oral Q6HRS PRN CORRIE Salinas.OKym        amLODIPine (Norvasc) tablet 10 mg  10 mg Oral DAILY Hraoon Castellanos D.O.   10 mg at 02/23/25 1009    [START ON 2/24/2025] aspirin EC tablet 81 mg  81 mg Oral MO, WE + FR CORRIE Salinas.OKym        atorvastatin (Lipitor) tablet 10 mg  10 mg Oral DAILY Haroon Castellanos D.O.   10 mg at  02/23/25 1008    insulin GLARGINE (Lantus,Semglee) injection  15 Units Subcutaneous Q EVENING CORRIE Salinas.O.        metoprolol tartrate (Lopressor) tablet 25 mg  25 mg Oral BID CORRIE Salinas.O.   25 mg at 02/23/25 1031    Respiratory Therapy Consult   Nebulization Continuous RT Haroon Castellanos D.O.        heparin injection 5,000 Units  5,000 Units Subcutaneous Q8HRS CORRIE Salinas.O.   5,000 Units at 02/23/25 1032    ondansetron (Zofran) syringe/vial injection 4 mg  4 mg Intravenous Q4HRS PRN CORRIE Salinas.O.        ondansetron (Zofran ODT) dispertab 4 mg  4 mg Oral Q4HRS PRN CORRIE Salinas.O.        promethazine (Phenergan) tablet 12.5-25 mg  12.5-25 mg Oral Q4HRS PRN CORRIE Salinas.O.        promethazine (Phenergan) suppository 12.5-25 mg  12.5-25 mg Rectal Q4HRS PRN CORRIE Salinas.O.        prochlorperazine (Compazine) injection 5-10 mg  5-10 mg Intravenous Q4HRS PRN CORRIE Salinas.O.        insulin lispro (HumaLOG,AdmeLOG) subcutaneous injection  2-9 Units Subcutaneous 4X/DAY ACHS CORRIE Salinas.OKym   2 Units at 02/23/25 1139    And    dextrose 50% (D50W) injection 25 g  25 g Intravenous Q15 MIN PRN CORRIE Salinas.O.        LORazepam (Ativan) injection 2 mg  2 mg Intravenous Q4HRS PRN RONDA SalinasO.        sodium bicarbonate 150 mEq in D5W infusion (premix)   Intravenous Continuous Wilber Partida M.D. 125 mL/hr at 02/23/25 1324 125 mL/hr at 02/23/25 1324    furosemide (Lasix) injection 100 mg  100 mg Intravenous BID DIURETIC Wilber Safdi, M.D.         Current Outpatient Medications   Medication Sig Dispense Refill    amLODIPine (NORVASC) 10 MG Tab Take 1 Tablet by mouth every day. 100 Tablet 3    insulin GLARGINE (LANTUS,SEMGLEE) 100 UNIT/ML Solution Inject 15 Units under the skin every evening for 90 days. 13.5 mL 0    metoprolol tartrate (LOPRESSOR) 25 MG Tab Take 1 Tablet by mouth  2 times a day for 90 days. 180 Tablet 0    acetaminophen (TYLENOL) 325 MG Tab Take 650 mg by mouth every 6 hours as needed for Mild Pain or Fever. 2 tablets = 650 mg.      aspirin 81 MG EC tablet Take 81 mg by mouth every Monday, Wednesday, and Friday.      atorvastatin (LIPITOR) 10 MG Tab Take 10 mg by mouth every day.         Allergies  Allergies   Allergen Reactions    Penicillins Unspecified     Patient unsure of reaction       Physical Exam  Temp:  [37 °C (98.6 °F)] 37 °C (98.6 °F)  Pulse:  [] 72  Resp:  [13-23] 23  BP: (142-218)/() 142/105  SpO2:  [90 %-98 %] 96 %    Physical Exam  Constitutional:       General: He is not in acute distress.     Appearance: He is well-developed. He is not diaphoretic.   HENT:      Head: Normocephalic and atraumatic.      Mouth/Throat:      Mouth: Mucous membranes are moist.      Pharynx: Oropharynx is clear. No oropharyngeal exudate.   Eyes:      General: No scleral icterus.     Extraocular Movements: Extraocular movements intact.   Neck:      Trachea: No tracheal deviation.   Cardiovascular:      Rate and Rhythm: Normal rate.      Heart sounds: Normal heart sounds. No murmur heard.  Pulmonary:      Effort: Pulmonary effort is normal.      Breath sounds: Normal breath sounds. No stridor. No rales.   Abdominal:      General: There is no distension.      Palpations: Abdomen is soft.      Tenderness: There is no abdominal tenderness.   Musculoskeletal:         General: Normal range of motion.      Right lower leg: Edema (trace) present.      Left lower leg: Edema (trace) present.   Skin:     General: Skin is warm and dry.   Neurological:      General: No focal deficit present.      Mental Status: He is alert and oriented to person, place, and time.   Psychiatric:         Mood and Affect: Mood normal.         Behavior: Behavior normal.         Fluids  Date 02/23/25 0700 - 02/24/25 0659   Shift 4589-0584 4992-1275 8766-1941 24 Hour Total   INTAKE   Shift Total      "  OUTPUT   Urine 2100   2100   Shift Total 2100   2100   Weight (kg) 81.3 81.3 81.3 81.3       Laboratory  Labs reviewed, pertinent labs below.  Recent Labs     02/21/25  0106 02/22/25  0559 02/23/25  0530   WBC 6.6 8.8 7.9   RBC 3.93* 4.35* 4.68*   HEMOGLOBIN 11.9* 13.1* 14.2   HEMATOCRIT 35.0* 38.7* 41.2*   MCV 89.1 89.0 88.0   MCH 30.3 30.1 30.3   MCHC 34.0 33.9 34.5   RDW 43.9 42.6 42.6   PLATELETCT 190 202 222   MPV 12.0 11.4 11.3     Recent Labs     02/23/25  0745 02/23/25  1030 02/23/25  1412   SODIUM 135 135 136   POTASSIUM 5.2 6.1* 5.0   CHLORIDE 102 101 101   CO2 21 18* 21   GLUCOSE 131* 99 270*   BUN 48* 48* 46*   CREATININE 2.14* 2.15* 2.12*   CALCIUM 10.6* 10.0 8.7                URINALYSIS:  Lab Results   Component Value Date/Time    COLORURINE Yellow 02/20/2025 1129    CLARITY Clear 02/20/2025 1129    SPECGRAVITY 1.011 02/20/2025 1129    PHURINE 5.0 02/20/2025 1129    KETONES Negative 02/20/2025 1129    PROTEINURIN 30 (A) 02/20/2025 1129    BILIRUBINUR Negative 02/20/2025 1129    UROBILU 1.0 02/20/2025 1129    NITRITE Negative 02/20/2025 1129    LEUKESTERAS Negative 02/20/2025 1129    OCCULTBLOOD Negative 02/20/2025 1129     UPC  No results found for: \"TOTPROTUR\" No results found for: \"CREATININEU\"    Imaging interpreted by radiologist. Imaging reports reviewed with pertinent findings below  No orders to display         Assessment/Plan  59 y.o. male with over 30-year history of diabetes, CKD 3 who presented 2/23/2025 with visual hallucinations, found to have hyperkalemia.    1.  CKD 3, unclear if 3A or 3B.  Patient appears to be near his recent baseline.  CKD is invariably due to diabetic nephropathy.  Avoid NSAIDs and other nephrotoxins.  When hyperkalemia resolves, ideally, patient should be treated with low-dose ACE inhibitor or ARB.  In addition, he should be treated with SGLT2 inhibitor if tolerated.  Check renal function panel at least once daily.    2.  Hyperkalemia, unclear trigger.  Likely " due to advanced kidney disease.  Recommend high-dose diuretics Lasix 100 mg IV twice daily until the patient is more euvolemic.  On discharge, I would recommend transitioning to torsemide 40 mg once daily for ease of dosing.  I do not recommend starting ACE inhibitor or ARB at this time.  Once the patient is adequately diuresed, I do not believe he will need chronic Lokelma.  I recommend 1 L of bicarbonate fluids as below.    3.  Metabolic acidosis, due to advanced CKD.  Order sodium bicarbonate 150 mEq in 1 L of D5W at a rate of 125 mL/h, for 1 L only.  Check renal function panel daily    4.  Azotemia, elevated BUN, without uremic symptoms.  No acute need for dialysis.  Check renal function panel daily.    5.  Hypertension, uncontrolled on admission, likely due to occult volume overload.  I recommend aggressive diuresis as above.  Avoid ACE inhibitors or ARB's until the patient's potassium level has been normalized or at least 48 hours    Discussed with Dr. Hal Partida MD  Nephrology  [facility]

## 2025-02-23 NOTE — ED NOTES
"Administered medications to pt per mar/MD orders. Pt stated \"do you see the creatures in the curtain?\"   "

## 2025-02-23 NOTE — PROGRESS NOTES
At shift change, the CNA came out and said the patient didn't feel safe and something was dripping from the ceiling.  Day charge Art went in to visit with the patient and he was already getting out of bed to get dressed and go.   This RN was also in the room at this time.  Patient was adamant that he was leaving.  Patient is a/o X4.  Patient did not state why he wanted to leave but that he was leaving.  Art reached out to Dr. Long to let her now.  Patient was asked if he needed any refills while he was here and he stated that he had his meds and the other meds they were going to refill was supposed to be called in to his clinic.  This information was passed on to Dr. Long.  IV was removed from patient.  Patient gathered all of his belongings and walked out.

## 2025-02-23 NOTE — ASSESSMENT & PLAN NOTE
Acute on probable chronic kidney disease  Started on IV Lasix  Nephrology consulted    2/24 worsening AMADO today  Cre 2.1>2.6  IV Lasix is decreased from 100 mg twice daily to 20 mg twice daily per nephro  Continue to monitor  Renal dose medications  Avoid nephrotoxic agent    2/25/2025  Baseline creatinine of 1.5 in May 2023.  GFR of 53 previously.  Creatinine increasing to 2.82 from 2.76.   Urine output of 1.1 L yesterday.   Discussed with Dr. Penny of nephrology.  I ordered 1 L NS as well as bladder scan.  Holding furosemide.

## 2025-02-24 LAB
ANION GAP SERPL CALC-SCNC: 15 MMOL/L (ref 7–16)
ANION GAP SERPL CALC-SCNC: 15 MMOL/L (ref 7–16)
BUN SERPL-MCNC: 53 MG/DL (ref 8–22)
BUN SERPL-MCNC: 60 MG/DL (ref 8–22)
CALCIUM SERPL-MCNC: 8.5 MG/DL (ref 8.5–10.5)
CALCIUM SERPL-MCNC: 8.8 MG/DL (ref 8.5–10.5)
CHLORIDE SERPL-SCNC: 96 MMOL/L (ref 96–112)
CHLORIDE SERPL-SCNC: 97 MMOL/L (ref 96–112)
CO2 SERPL-SCNC: 24 MMOL/L (ref 20–33)
CO2 SERPL-SCNC: 24 MMOL/L (ref 20–33)
CREAT SERPL-MCNC: 2.6 MG/DL (ref 0.5–1.4)
CREAT SERPL-MCNC: 2.76 MG/DL (ref 0.5–1.4)
GFR SERPLBLD CREATININE-BSD FMLA CKD-EPI: 25 ML/MIN/1.73 M 2
GFR SERPLBLD CREATININE-BSD FMLA CKD-EPI: 27 ML/MIN/1.73 M 2
GLUCOSE BLD STRIP.AUTO-MCNC: 125 MG/DL (ref 65–99)
GLUCOSE BLD STRIP.AUTO-MCNC: 163 MG/DL (ref 65–99)
GLUCOSE BLD STRIP.AUTO-MCNC: 171 MG/DL (ref 65–99)
GLUCOSE BLD STRIP.AUTO-MCNC: 68 MG/DL (ref 65–99)
GLUCOSE SERPL-MCNC: 151 MG/DL (ref 65–99)
GLUCOSE SERPL-MCNC: 94 MG/DL (ref 65–99)
POTASSIUM SERPL-SCNC: 4.4 MMOL/L (ref 3.6–5.5)
POTASSIUM SERPL-SCNC: 4.4 MMOL/L (ref 3.6–5.5)
SODIUM SERPL-SCNC: 135 MMOL/L (ref 135–145)
SODIUM SERPL-SCNC: 136 MMOL/L (ref 135–145)

## 2025-02-24 PROCEDURE — 700102 HCHG RX REV CODE 250 W/ 637 OVERRIDE(OP): Performed by: STUDENT IN AN ORGANIZED HEALTH CARE EDUCATION/TRAINING PROGRAM

## 2025-02-24 PROCEDURE — 99232 SBSQ HOSP IP/OBS MODERATE 35: CPT | Performed by: INTERNAL MEDICINE

## 2025-02-24 PROCEDURE — 99233 SBSQ HOSP IP/OBS HIGH 50: CPT | Performed by: STUDENT IN AN ORGANIZED HEALTH CARE EDUCATION/TRAINING PROGRAM

## 2025-02-24 PROCEDURE — 770020 HCHG ROOM/CARE - TELE (206)

## 2025-02-24 PROCEDURE — A9270 NON-COVERED ITEM OR SERVICE: HCPCS | Performed by: STUDENT IN AN ORGANIZED HEALTH CARE EDUCATION/TRAINING PROGRAM

## 2025-02-24 PROCEDURE — 700111 HCHG RX REV CODE 636 W/ 250 OVERRIDE (IP): Performed by: HOSPITALIST

## 2025-02-24 PROCEDURE — 82962 GLUCOSE BLOOD TEST: CPT | Mod: 91

## 2025-02-24 PROCEDURE — A9270 NON-COVERED ITEM OR SERVICE: HCPCS | Performed by: HOSPITALIST

## 2025-02-24 PROCEDURE — 80048 BASIC METABOLIC PNL TOTAL CA: CPT

## 2025-02-24 PROCEDURE — 700111 HCHG RX REV CODE 636 W/ 250 OVERRIDE (IP): Mod: JZ | Performed by: INTERNAL MEDICINE

## 2025-02-24 PROCEDURE — 700102 HCHG RX REV CODE 250 W/ 637 OVERRIDE(OP): Performed by: HOSPITALIST

## 2025-02-24 PROCEDURE — 36415 COLL VENOUS BLD VENIPUNCTURE: CPT

## 2025-02-24 RX ORDER — FUROSEMIDE 10 MG/ML
20 INJECTION INTRAMUSCULAR; INTRAVENOUS
Status: DISCONTINUED | OUTPATIENT
Start: 2025-02-24 | End: 2025-02-26 | Stop reason: HOSPADM

## 2025-02-24 RX ADMIN — INSULIN LISPRO 2 UNITS: 100 INJECTION, SOLUTION INTRAVENOUS; SUBCUTANEOUS at 20:19

## 2025-02-24 RX ADMIN — ATORVASTATIN CALCIUM 10 MG: 10 TABLET, FILM COATED ORAL at 06:24

## 2025-02-24 RX ADMIN — METOPROLOL TARTRATE 25 MG: 25 TABLET, FILM COATED ORAL at 17:44

## 2025-02-24 RX ADMIN — ACETAMINOPHEN 650 MG: 325 TABLET ORAL at 22:22

## 2025-02-24 RX ADMIN — INSULIN GLARGINE-YFGN 15 UNITS: 100 INJECTION, SOLUTION SUBCUTANEOUS at 17:40

## 2025-02-24 RX ADMIN — FUROSEMIDE 20 MG: 10 INJECTION, SOLUTION INTRAVENOUS at 16:39

## 2025-02-24 RX ADMIN — ARIPIPRAZOLE 2 MG: 2 TABLET ORAL at 17:44

## 2025-02-24 RX ADMIN — HEPARIN SODIUM 5000 UNITS: 5000 INJECTION, SOLUTION INTRAVENOUS; SUBCUTANEOUS at 21:06

## 2025-02-24 RX ADMIN — INSULIN LISPRO 2 UNITS: 100 INJECTION, SOLUTION INTRAVENOUS; SUBCUTANEOUS at 17:41

## 2025-02-24 RX ADMIN — METOPROLOL TARTRATE 25 MG: 25 TABLET, FILM COATED ORAL at 06:24

## 2025-02-24 RX ADMIN — Medication 5 MG: at 23:14

## 2025-02-24 RX ADMIN — SODIUM ZIRCONIUM CYCLOSILICATE 10 G: 10 POWDER, FOR SUSPENSION ORAL at 12:35

## 2025-02-24 RX ADMIN — AMLODIPINE BESYLATE 10 MG: 10 TABLET ORAL at 06:24

## 2025-02-24 RX ADMIN — HEPARIN SODIUM 5000 UNITS: 5000 INJECTION, SOLUTION INTRAVENOUS; SUBCUTANEOUS at 06:24

## 2025-02-24 RX ADMIN — HEPARIN SODIUM 5000 UNITS: 5000 INJECTION, SOLUTION INTRAVENOUS; SUBCUTANEOUS at 14:50

## 2025-02-24 RX ADMIN — Medication 5 MG: at 01:02

## 2025-02-24 RX ADMIN — ASPIRIN 81 MG: 81 TABLET, COATED ORAL at 09:29

## 2025-02-24 RX ADMIN — FUROSEMIDE 100 MG: 10 INJECTION, SOLUTION INTRAVENOUS at 06:24

## 2025-02-24 ASSESSMENT — ENCOUNTER SYMPTOMS
PALPITATIONS: 0
FLANK PAIN: 0
HEMOPTYSIS: 0
NAUSEA: 0
WEIGHT LOSS: 0
SINUS PAIN: 0
ORTHOPNEA: 0
EYES NEGATIVE: 1
COUGH: 0
WHEEZING: 0
CHILLS: 0
VOMITING: 0
SHORTNESS OF BREATH: 0
ABDOMINAL PAIN: 0
FEVER: 0

## 2025-02-24 ASSESSMENT — FIBROSIS 4 INDEX: FIB4 SCORE: 1.23

## 2025-02-24 ASSESSMENT — PAIN DESCRIPTION - PAIN TYPE
TYPE: ACUTE PAIN

## 2025-02-24 NOTE — PROGRESS NOTES
Nephrology Daily Progress Note    Date of Service  2/24/2025    Chief Complaint  59 y.o. male with CKD IIIb, admitted 2/23/2025 with AMADO, hyperkalemia    Interval Problem Update  2/24 -doing well, no complaints  No acute events  Baseline creat level at 2.0-2.2 -worse to 2.6    Review of Systems  Review of Systems   Constitutional:  Negative for chills, fever, malaise/fatigue and weight loss.   HENT:  Negative for congestion, hearing loss and sinus pain.    Eyes: Negative.    Respiratory:  Negative for cough, hemoptysis, shortness of breath and wheezing.    Cardiovascular:  Negative for chest pain, palpitations, orthopnea and leg swelling.   Gastrointestinal:  Negative for abdominal pain, nausea and vomiting.   Genitourinary:  Negative for dysuria, flank pain, frequency, hematuria and urgency.   Skin: Negative.    All other systems reviewed and are negative.       Physical Exam  Temp:  [36.7 °C (98.1 °F)-37.2 °C (99 °F)] 36.9 °C (98.4 °F)  Pulse:  [62-82] 62  Resp:  [16-23] 16  BP: (108-178)/(50-96) 108/59  SpO2:  [92 %-98 %] 97 %    Physical Exam  Vitals reviewed.   Constitutional:       General: He is not in acute distress.     Appearance: Normal appearance. He is well-developed. He is not diaphoretic.   HENT:      Head: Normocephalic and atraumatic.      Nose: Nose normal.      Mouth/Throat:      Mouth: Mucous membranes are moist.      Pharynx: Oropharynx is clear.   Eyes:      Extraocular Movements: Extraocular movements intact.      Conjunctiva/sclera: Conjunctivae normal.      Pupils: Pupils are equal, round, and reactive to light.   Cardiovascular:      Rate and Rhythm: Normal rate and regular rhythm.      Pulses: Normal pulses.      Heart sounds: Normal heart sounds.   Pulmonary:      Effort: Pulmonary effort is normal. No respiratory distress.      Breath sounds: Normal breath sounds. No wheezing or rales.   Abdominal:      General: Bowel sounds are normal. There is no distension.      Palpations: Abdomen is  "soft. There is no mass.      Tenderness: There is no abdominal tenderness. There is no right CVA tenderness or left CVA tenderness.   Musculoskeletal:      Cervical back: Normal range of motion and neck supple.      Right lower leg: No edema.      Left lower leg: No edema.   Skin:     General: Skin is warm.      Coloration: Skin is not pale.      Findings: No erythema or rash.   Neurological:      General: No focal deficit present.      Mental Status: He is alert and oriented to person, place, and time.      Cranial Nerves: No cranial nerve deficit.      Coordination: Coordination normal.   Psychiatric:         Mood and Affect: Mood normal.         Behavior: Behavior normal.         Thought Content: Thought content normal.         Judgment: Judgment normal.         Fluids    Intake/Output Summary (Last 24 hours) at 2/24/2025 1210  Last data filed at 2/24/2025 0849  Gross per 24 hour   Intake 880 ml   Output 1551 ml   Net -671 ml       Laboratory  Recent Labs     02/22/25  0559 02/23/25  0530   WBC 8.8 7.9   RBC 4.35* 4.68*   HEMOGLOBIN 13.1* 14.2   HEMATOCRIT 38.7* 41.2*   MCV 89.0 88.0   MCH 30.1 30.3   MCHC 33.9 34.5   RDW 42.6 42.6   PLATELETCT 202 222   MPV 11.4 11.3     Recent Labs     02/23/25  1030 02/23/25  1412 02/24/25  0615   SODIUM 135 136 136   POTASSIUM 6.1* 5.0 4.4   CHLORIDE 101 101 97   CO2 18* 21 24   GLUCOSE 99 270* 94   BUN 48* 46* 53*   CREATININE 2.15* 2.12* 2.60*   CALCIUM 10.0 8.7 8.5         No results for input(s): \"NTPROBNP\" in the last 72 hours.        Imaging  No orders to display        Assessment/Plan    59 y.o. male with over 30-year history of diabetes, CKD III who presented 2/23/2025 with visual hallucinations, found to have hyperkalemia.     1.AMADO/CKD IIIb - creat slightly worse to reduce Lasix dose     To monitor    2. Electrolytes : hyperkalemia corrected -to monitor    3. HTN: low BP to monitor -reduce Lasix dose    4. Anemia: Hb stable at normal range      REcs:  Lasix 20 mg " BID  Low salt diet  Keep well hydrated  Avoid iv contrast  Daily BMP  Will follow

## 2025-02-24 NOTE — CONSULTS
"PSYCHIATRIC CONSULTATION:  *Reason for admission:   Hyperkalemia, chronic kidney disease with acute kidney injury, hallucinations  *Reason for consult:psychosis   *Requesting Physician:Dr. Kulwant Spencer          Legal status:  not applicable    *Chief Complaint:\" I was seeing things and it was making me scared\"    *HPI:   Mr. Agosto is a 59-year-old male seen today for an initial psychiatric evaluation of psychosis, hallucinations.  The patient was admitted on  after having left AGAINST MEDICAL ADVICE yesterday.  Patient was apparently seeing Rijn in his room and became very paranoid and frightened and wanted to leave the hospital.  However, he began feeling ill at home and decided to return to the hospital.  Since returning to the hospital he has not had any hallucinations.  However, he was noted to have hyperkalemia, metabolic acidosis and elevated creatinine.  He has been admitted for treatment of hyperkalemia and chronic kidney disease.    The patient reports he is not having any hallucinations right now but states that the reason he came back to the hospital was continued hallucinations at home.  He states as soon as he got into his car to drive home he started to see the trees moving around in a funny way.  When he went home he started to see birds flying around in his room.  He was unable to sleep at home because of seeing these birds around his room.  He started to feel physically worse so he decided to return to the hospital and states he would like to stay to receive treatment now because he is worried about damaging his heart and kidneys and needing dialysis.  He reports his father was on dialysis until he  and he wants to avoid dialysis is much as possible.   In regards to the hallucinations the patient reports he has had hallucinations (only visual in his life) when he was younger.  He states though he was abusing all kinds of drugs at the time.  He feels that the hallucinations come from " "evil spirits that he introduced into his life when he was\" running around and using drugs.\"  He has worries that the evil spirits have continued to follow him and are continuing to cause his hallucinations even though he has been sober for the last 25 years.  He states he has had instances in his life over the last 25 years where he has had hallucinations.  He reports they have been worse over the last several months when he has been feeling sick.  I asked the patient whether he thinks it is possible that the hallucinations are secondary to becoming more physically ill instead of from evil spirits and he states\" I guess that is possible, I will have to see.\"  The patient is not currently expressing any paranoid delusions.  He reports he feels safe in the hospital and wants to remain here for treatment as long as it takes.  He also denies any thoughts of harming self or others.    Discussed starting a low-dose antipsychotic, Abilify for treatment of hallucinations.  Patient is in agreement with this.  He also is in agreement with using melatonin to help with sleep    Patient did give me permission to speak with his brother, Charles ([phone])  The patient's brother agrees that the patient seemed to have hallucinations as well as paranoia when he was using drugs.  He reports that his brother used\" all kinds of drugs even sniffing paint\" all throughout his youth.  He states the only other times that he noted the patient to complain of hallucinations and appear paranoid is when the patient was homeless.  He states that even when his brother was not using drugs he was not compliant with his medications for diabetes and so he wonders whether noncompliance with medications contributed to the development of visual hallucinations.  He states that the patient has been doing well financially the last several years since he found him a job at the [employer].  His brother has been able to live in his own apartment and take " "care of himself in his own apartment the last several years.  He has been sober for over 25 years which is why the patient is nervous about taking medications.  He is afraid of becoming addicted to something.  He reports the only times recently he has noted the patient to have hallucinations is also when he is confused.  For instance, when he was having hallucinations the other night the patient seemed to think he was in snf and that the nurses were [facility] guards who he could not trust.      *Psychiatric Review of Systems:    ROS:  Mood:\" Doing okay now\"  Sleep:\" I have been able to sleep\"  Appetite:\" It has been okay\"  Energy:\" I been really tired since have gotten sick\"  SI/HI: Denies  Hallucinations: None currently, has not been experiencing auditory hallucinations only visual hallucinations  Bipolar: No prior symptoms of priyanka or hypomania  Trauma: No nightmares or flashbacks    *Medical Review of Systems: as reported by pt. All systems reviewed. Only those found to be + are noted below. All others are negative.   Review of Systems   Respiratory:  Negative for shortness of breath.    Cardiovascular:  Negative for chest pain.   Gastrointestinal:  Negative for abdominal pain, nausea and vomiting.   Neurological:  Negative for tremors and headaches.         *Past Medical Hx:   Patient Active Problem List   Diagnosis    AMADO (acute kidney injury) (HCC)    Type 2 diabetes mellitus with hyperglycemia, without long-term current use of insulin (HCC)    Primary hypertension    Mixed hyperlipidemia    Hyponatremia    Metabolic acidosis    Dehydration    Hyperkalemia    Hallucinations        *Family Medical/Psychiatric Hx:  Patient reports that his father, brothers and multiple family members have struggled with alcohol abuse in the past    *Past Psychiatric Hx: No prior psychiatric diagnosis  IP treatment:: None  OP treatment: None   Meds tried: None   Suicide attempts: None   Trauma: None    *Social Hx:  Living " "situation: Currently living in his own apartment in [place], [place]  Childhood: Raised by his biological mother as his father left them when he was young.  He reports that his mother was\" awesome\" and denies any abuse in childhood.  Reports he has several brothers, currently close to his younger brother Charles who lives in [facility] and helps him.  Reports he reconnected with his father later in life and took care of his father until his father  from kidney disease  Education/Employment (hx of IDD?): Currently working at the [employer] in housekeeping, reports he has been a  at the [employer] for the last 3 years.  Patient completed eighth grade, has problems with literacy per brother  Relationships (estrangements?): Never , has 1 daughter but he is estranged from her    Legal Hx: Patient reports he has been incarcerated, longest [facility] stay was 3 years.  Access to guns: Patient reports he is not able to own guns or firearms because he is a convicted felon    *Drug/Alcohol/Tobacco Hx:   The patient denies any alcohol or illicit drug use in the last 25 years    *Psychiatric (Physical) Examination: observed phenomenon:  Vitals:    25 1521   BP: (!) 151/77   Pulse: 77   Resp: 20   Temp: 37.2 °C (99 °F)   SpO2: 98%     General: Awake, alert in no acute distress  Patient Appearance: Appropriate, fairly groomed, wearing a hospital gown and lying in bed  Behavior: Appropriate Behavior, Calm, Cooperative  Speech.: Spontaneous, Normal rate and rhythm, Answers appropriately, normal  volume  Mood Comments:\" I am fine now\"  Affect: appears euthymic  Thought Content: Appropriate, no paranoid or bizarre delusions, no suicidal ideation, No thoughts of self harm,  No homicidal ideation, Contracts for safety, Feels life is worth living  Thought Process: Logical and goal directed, No loosening of associations  Psychosis: No delusions, No hallucinations at this time, does not appear to be " responding to internal stimuli  Insight: Fair insight  Judgment: Fair judgment  Cognition: Recent memory appeared intact in interview., Concentration is intact to conversation, fully oriented to person, place, time and circumstance.  Language: Is able to repeat phrases. and Is able to name objects.  Fund of Knowledge: AS expected for age and level of education (per patient's brother patient completed eighth grade)  Neuro: no tics, tremors, dyskinesias. no dystonias. Gait not observed    Screenings:  [facility]-ICU screen (2/23/2025): Negative for delirium  Clock drawing attempted.  All numbers were in the correct position.  Able to draw the hands on the clock to correctly indicate the time of 11:10 AM     Allergies:   Allergies   Allergen Reactions    Penicillins Unspecified     Patient unsure of reaction        Medications (currently prescribed at [facility]):    Current Facility-Administered Medications:     calcium GLUConate 1 g in NaCl IVPB premix, 1 g, Intravenous, Once, Isabella Gomes D.O., Held at 02/23/25 0645    sodium zirconium cyclosilicate (Lokelma) packet 10 g, 10 g, Oral, DAILY AT NOON, Haroon Castellanos DKymO., 10 g at 02/23/25 1334    acetaminophen (Tylenol) tablet 650 mg, 650 mg, Oral, Q6HRS PRN, Haroon Castellanos D.O.    amLODIPine (Norvasc) tablet 10 mg, 10 mg, Oral, DAILY, Haroon Castellanos D.O., 10 mg at 02/23/25 1009    [START ON 2/24/2025] aspirin EC tablet 81 mg, 81 mg, Oral, MO, WE + FR, CORRIE Salinas.O.    atorvastatin (Lipitor) tablet 10 mg, 10 mg, Oral, DAILY, Haroon Castellanos D.O., 10 mg at 02/23/25 1008    insulin GLARGINE (Lantus,Semglee) injection, 15 Units, Subcutaneous, Q EVENING, CORRIE Salinas.O., 15 Units at 02/23/25 1759    metoprolol tartrate (Lopressor) tablet 25 mg, 25 mg, Oral, BID, Haroon Castellanos D.O., 25 mg at 02/23/25 1806    Respiratory Therapy Consult, , Nebulization, Continuous RT, Haroon Castellnaos D.O.    heparin  injection 5,000 Units, 5,000 Units, Subcutaneous, Q8HRS, Haroon Castellanos D.O., 5,000 Units at 02/23/25 1553    ondansetron (Zofran) syringe/vial injection 4 mg, 4 mg, Intravenous, Q4HRS PRN, RONDA SalinasO.    ondansetron (Zofran ODT) dispertab 4 mg, 4 mg, Oral, Q4HRS PRN, CORRIE Salinas.O.    promethazine (Phenergan) tablet 12.5-25 mg, 12.5-25 mg, Oral, Q4HRS PRN, CORRIE Salinas.O.    promethazine (Phenergan) suppository 12.5-25 mg, 12.5-25 mg, Rectal, Q4HRS PRN, CORRIE Salinas.O.    prochlorperazine (Compazine) injection 5-10 mg, 5-10 mg, Intravenous, Q4HRS PRN, CORRIE Salinas.O.    insulin lispro (HumaLOG,AdmeLOG) subcutaneous injection, 2-9 Units, Subcutaneous, 4X/DAY ACHS, 2 Units at 02/23/25 1139 **AND** POC blood glucose manual result, , , Q AC AND BEDTIME(S) **AND** [COMPLETED] NOTIFY MD and PharmD, , , Once **AND** Administer 20 grams of glucose (approximately 8 ounces of fruit juice) every 15 minutes PRN FSBG less than 70 mg/dL, , , PRN **AND** dextrose 50% (D50W) injection 25 g, 25 g, Intravenous, Q15 MIN PRN, RONDA SalinasO.    LORazepam (Ativan) injection 2 mg, 2 mg, Intravenous, Q4HRS PRN, RONDA SalinasO.    sodium bicarbonate 150 mEq in D5W infusion (premix), , Intravenous, Continuous, Wilber Partida M.D., Continue to Floor at 02/23/25 1510    furosemide (Lasix) injection 100 mg, 100 mg, Intravenous, BID DIURETIC, Wilber Partida M.D., 100 mg at 02/23/25 1554    *Labs:  Lab Results   Component Value Date/Time    SODIUM 136 02/23/2025 02:12 PM    POTASSIUM 5.0 02/23/2025 02:12 PM    CHLORIDE 101 02/23/2025 02:12 PM    CO2 21 02/23/2025 02:12 PM    GLUCOSE 270 (H) 02/23/2025 02:12 PM    BUN 46 (H) 02/23/2025 02:12 PM    CREATININE 2.12 (H) 02/23/2025 02:12 PM      Recent Labs     02/20/25  0834 02/21/25  0106   ASTSGOT 32 22   ALTSGPT 33 24   TBILIRUBIN 0.5 0.3   GLOBULIN 3.7* 3.0     Lab Results   Component Value  Date/Time    WBC 7.9 02/23/2025 05:30 AM    RBC 4.68 (L) 02/23/2025 05:30 AM    HEMOGLOBIN 14.2 02/23/2025 05:30 AM    HEMATOCRIT 41.2 (L) 02/23/2025 05:30 AM    MCV 88.0 02/23/2025 05:30 AM    MCH 30.3 02/23/2025 05:30 AM    MCHC 34.5 02/23/2025 05:30 AM    MPV 11.3 02/23/2025 05:30 AM    NEUTSPOLYS 72.90 (H) 02/23/2025 05:30 AM    LYMPHOCYTES 18.70 (L) 02/23/2025 05:30 AM    MONOCYTES 6.80 02/23/2025 05:30 AM    EOSINOPHILS 0.80 02/23/2025 05:30 AM    BASOPHILS 0.50 02/23/2025 05:30 AM        EKG (Qtc) 2/23/2025  Intervals                                Axis   Rate:       79                           P:          14   LA:         131                          QRS:        -11   QRSD:       116                          T:          43   QT:         372   QTc:        427     Interpretive Statements   Sinus rhythm   Incomplete left bundle branch block   Probable left ventricular hypertrophy   Anterior ST elevation, probably due to LVH   Compared to ECG 02/23/2025 05:23:34   Left bundle-branch block now present   Left ventricular hypertrophy now present   ST (T wave) deviation still present     TSH (2/20/2025): 0.766      *ASSESSMENT:   Mr. Agosto is a 59-year-old male seen today for an initial psychiatric evaluation of hallucinations.  Patient has been having intermittent visual hallucinations since his youth.  He reports that visual hallucinations started when he was abusing various illicit substances including amphetamines.  He reports that he had less frequent visual hallucinations when he was sober but that visual hallucinations have begun again since he has been having more problems with his kidneys.  He has thoughts that he is having these hallucinations because of evil spirits that he introduced into his life from abusing illicit substances as a young man.  He does however entertain the possibility that these visual hallucinations could be secondary to delirium from medical illness.  He is willing to trial a  low-dose antipsychotic to help with hallucinations and also is willing to start melatonin to help regulate sleep.  His brother supports the fact that the patient seemed to have paranoia and hallucinations when abusing illicit substances and has had intermittent hallucinations when noncompliant with medicines for diabetes.  He reports more recently when the patient is having hallucinations he also appears confused.  Patient is currently denying any thoughts of harming self or others.  He does not express delusions    Diagnosis:  Visual hallucinations secondary to general medical condition  --> Could be due to mild metabolic encephalopathy, patient also may have vision deficits (does state he has difficulty seeing small print) which may predispose him to having visual hallucinations      *Plan/Further Workup:   Legal status: not applicable at this time, however if the patient again wants to leave AGAINST MEDICAL ADVICE would consider placing patient on a legal hold and reconsulting psychiatry to evaluate this legal hold as psychotic symptoms may be interfering with patient's ability to make rational medical decisions at that time.  Currently, patient is agreeable to remaining in the hospital for medical treatment so psychotic symptoms do not appear to be interfering with patient's ability to make medical decisions    *Medication Managment:       -Start Abilify 2 mg in the evening time for psychosis  -Start melatonin 5 mg at bedtime as needed for insomnia  *Labs Reviewed/Ordered:    *Imaging Reviewed/Ordered:  *Medical Tests Reviewed/Ordered:    *Ordered Old Records/Obtain Collateral: From patient's brotherCharles  *[facility] Records Summarized:  *Discussed with another provider: Dr. Kulwant Spencer       Will follow  Thank you for the consult.

## 2025-02-24 NOTE — CARE PLAN
The patient is Watcher - Medium risk of patient condition declining or worsening    Shift Goals  Clinical Goals: Safety, free from falls, reduce anxiety, trend labs  Patient Goals: Safety  Family Goals: JORDAN    Progress made toward(s) clinical / shift goals:    Problem: Knowledge Deficit - Standard  Goal: Patient and family/care givers will demonstrate understanding of plan of care, disease process/condition, diagnostic tests and medications    2/24/2025 0230 by Eloisa Chacon R.N.  Outcome: Progressing  Note: Pt updated on POC, all current questions answered at this time       Problem: Fall Risk  Goal: Patient will remain free from falls      2/24/2025 0230 by Eloisa Chacon R.N.  Outcome: Progressing  Note: Treaded socks and bed/strip alarm on, side rails up x 3. Call light within reach. Pt educated to call for assistance. Reinforce as needed. Telesitter at bedside for safety.          Patient is not progressing towards the following goals:

## 2025-02-24 NOTE — DISCHARGE PLANNING
Care Transition Team Assessment  LMSW met with pt at bedside to conduct assessment and verify demographics. Pt was agreeable and provided all of the following information below. Pt is AOX4. Pt was admitted on 2/23/25 for Hyperkalemia. Please see pt's H&P for prior medical history.     Pt's PCP is Baldo Long.    Pt verified address on file. Pt lives alone in a loft. Pt's emergency contact on file is pt's brother Charles Almaguer [phone].    Prior to admission pt stated that he was independent with ADLS and IADLS with no use of DME.     Pt denies HH/SNF/IPR history.     Pt's insurance is [facility] and [facility].    Upon DC pt stated that he will have means of transportation home.     Information Source  Orientation Level: Oriented X4  Information Given By: Patient  Who is responsible for making decisions for patient? : Patient    Readmission Evaluation  Is this a readmission?: No    Elopement Risk  Legal Hold: No  Ambulatory or Self Mobile in Wheelchair: Yes  Disoriented: Time-At Risk for Elopement  Psychiatric Symptoms: Hallucinations-at Risk for Elopement, Impulsivity-at Risk for Elopement  Elopement this Admit: No  Vocalizing Wanting to Leave: No  Displays Behaviors, Body Language Wanting to Leave: No-Not at Risk for Elopement  Elopement Risk: Not at Risk for Elopement  Wanderguard On: No (See Comments) (Telesitter at bedside)    Interdisciplinary Discharge Planning  Primary Care Physician: BALDO LONG M.D.  Lives with - Patient's Self Care Capacity: Alone and Able to Care For Self  Patient or legal guardian wants to designate a caregiver: No  Support Systems: Family Member(s)  Housing / Facility: 2 Story Apartment / Condo    Discharge Preparedness  What is your plan after discharge?: Home with help  What are your discharge supports?: Sibling  Prior Functional Level: Ambulatory, Independent with Activities of Daily Living, Independent with Medication Management  Difficulity with ADLs: None  Difficulity with IADLs:  None    Functional Assesment  Prior Functional Level: Ambulatory, Independent with Activities of Daily Living, Independent with Medication Management    Finances  Financial Barriers to Discharge: No  Prescription Coverage: Yes    Vision / Hearing Impairment  Vision Impairment : Yes  Right Eye Vision: Impaired, Wears Glasses  Left Eye Vision: Impaired, Wears Glasses  Hearing Impairment : No    Advance Directive  Advance Directive?: None    Domestic Abuse  Have you ever been the victim of abuse or violence?: No  Possible Abuse/Neglect Reported to:: Not Applicable    Psychological Assessment  History of Substance Abuse: None  History of Psychiatric Problems: No  Non-compliant with Treatment: No  Newly Diagnosed Illness: No    Discharge Risks or Barriers  Discharge risks or barriers?: No    Anticipated Discharge Information  Discharge Disposition: Discharged to home/self care (01)  Discharge Address: [address] [place] [address] [address] [id number]  Discharge Contact Phone Number: [phone]

## 2025-02-24 NOTE — PROGRESS NOTES
Monitor Summary  Rhythm: Normal Sinus Rhythm  Rate: 74  Ectopy: None Noted  .15 / .10 / .43          12 hr Chart check.

## 2025-02-24 NOTE — PROGRESS NOTES
Patient started to endorse auditory hallucinations and increased visual hallucinations. Pt locked himself in bathroom and RN attempted to deescalate situation. Security called to bedside. Pt medicated per MAR. Pt spoke with security. LEONELA Faye notified, orders placed.

## 2025-02-24 NOTE — PROGRESS NOTES
Hospital Medicine Daily Progress Note    Date of Service  2/24/2025    Chief Complaint  Juan Almaguer is a 59 y.o. male admitted 2/23/2025 with hyperkalemia    Hospital Course  Juan Almaguer is a 59 y.o. male who presented 2/23/2025 with history of type 2 diabetes, hypertension, dyslipidemia, hyperkalemia, distant history of stimulant abuse.     Patient was just in the hospital having been admitted for acute on chronic kidney disease and hyperkalemia.  He left yesterday morning, after having had hallucinations of rain inside his room and people outside the [facility] trying to get him.  He reports he was unable to sleep for the 48 hours he was here.  He comes back today with his brother, stating that he knows he needs to be in the hospital and is going to try and stay.     On presentation initial labs show potassium of 7.4 CO2 of 20 glucose 102 creatinine 2.28 BUN 51.  After treatment for hyperkalemia, repeat labs shows potassium of 5.2 with creatinine 2.14.  Patient is being admitted for treatment of hyperkalemia, acute on chronic kidney disease further evaluation of his hallucinations    Interval Problem Update  I have seen and examined patient at bedside    He reported visual hallucination last night, but not this morning  Worsening AMADO-Lasix dose was decreased per nephro    Potassium of 4.4, continue Lokelma      I have discussed this patient's plan of care and discharge plan at IDT rounds today with Case Management, Nursing, Nursing leadership, and other members of the IDT team.    Consultants/Specialty  Psychiatry, nephrology    Code Status  Full Code    Disposition  The patient is not medically cleared for discharge to home or a post-acute facility.      I have placed the appropriate orders for post-discharge needs.    Review of Systems  ROS     Physical Exam  Temp:  [36.7 °C (98.1 °F)-37.2 °C (99 °F)] 36.9 °C (98.4 °F)  Pulse:  [62-79] 62  Resp:  [16-20] 16  BP: (108-175)/(50-96) 108/59  SpO2:   [92 %-98 %] 97 %    Physical Exam  Constitutional:       General: He is not in acute distress.     Appearance: He is ill-appearing.   HENT:      Head: Normocephalic.      Mouth/Throat:      Mouth: Mucous membranes are moist.   Eyes:      Extraocular Movements: Extraocular movements intact.      Conjunctiva/sclera: Conjunctivae normal.   Cardiovascular:      Rate and Rhythm: Normal rate and regular rhythm.      Pulses: Normal pulses.      Heart sounds: Normal heart sounds.   Pulmonary:      Effort: Pulmonary effort is normal.      Breath sounds: Normal breath sounds.   Abdominal:      General: Bowel sounds are normal. There is no distension.      Palpations: Abdomen is soft.      Tenderness: There is no abdominal tenderness.   Musculoskeletal:         General: No swelling or tenderness.      Cervical back: Normal range of motion and neck supple.   Skin:     General: Skin is warm.      Coloration: Skin is not jaundiced.   Neurological:      Mental Status: He is alert and oriented to person, place, and time. Mental status is at baseline.   Psychiatric:         Mood and Affect: Mood normal.         Fluids    Intake/Output Summary (Last 24 hours) at 2/24/2025 1424  Last data filed at 2/24/2025 0849  Gross per 24 hour   Intake 880 ml   Output 1051 ml   Net -171 ml        Laboratory  Recent Labs     02/22/25  0559 02/23/25  0530   WBC 8.8 7.9   RBC 4.35* 4.68*   HEMOGLOBIN 13.1* 14.2   HEMATOCRIT 38.7* 41.2*   MCV 89.0 88.0   MCH 30.1 30.3   MCHC 33.9 34.5   RDW 42.6 42.6   PLATELETCT 202 222   MPV 11.4 11.3     Recent Labs     02/23/25  1030 02/23/25  1412 02/24/25  0615   SODIUM 135 136 136   POTASSIUM 6.1* 5.0 4.4   CHLORIDE 101 101 97   CO2 18* 21 24   GLUCOSE 99 270* 94   BUN 48* 46* 53*   CREATININE 2.15* 2.12* 2.60*   CALCIUM 10.0 8.7 8.5                   Imaging  No orders to display        Assessment/Plan  * Hyperkalemia- (present on admission)  Assessment & Plan  Patient presents with hyperkalemia due to his  acute on chronic kidney injury  He has been treated with the hyperkalemia protocol here in the ED and his initial K of 7.4 is now down to 5.2  We will bolus 2 L of normal saline, and give 80 mg IV of Lasix  Nephrology consulted    2/24 K 7>5>4.4  Continue Lokelma  Continue to monitor      Hallucinations  Assessment & Plan  Patient describes hallucinations that predate this hospital stay.  He reports going back perhaps 6 months.  For the moment we will temporize with Ativan  Consult psychiatry  I have asked nursing and lab staff to try not to disturb the patient at night so that he may get 8 hours of restful sleep  He reports that he stopped using drugs 6 months ago    2/24 continue Abilify and melatonin per psych  I reviewed the psych notes    Mixed hyperlipidemia- (present on admission)  Assessment & Plan  Statin    Primary hypertension- (present on admission)  Assessment & Plan  Continue metoprolol 25 mg twice daily, and amlodipine 10 mg  Monitor and titrate    Type 2 diabetes mellitus with hyperglycemia, without long-term current use of insulin (HCC)- (present on admission)  Assessment & Plan  Patient is on semaglutide and 15 units of Lantus as an outpatient  Last A1c was 5.52 days ago  We will cover the sliding scale for the moment  Monitor blood glucose  Hypoglycemia protocol in place  Given his renal function and SGLT2i would be appropriate    AMADO (acute kidney injury) (HCC)- (present on admission)  Assessment & Plan  Acute on probable chronic kidney disease  Started on IV Lasix  Nephrology consulted    2/24 worsening AMADO today  Cre 2.1>2.6  IV Lasix is decreased from 100 mg twice daily to 20 mg twice daily per nephro  Continue to monitor  Renal dose medications  Avoid nephrotoxic agent             VTE prophylaxis: Heparin subcu    I have performed a physical exam and reviewed and updated ROS and Plan today (2/24/2025). In review of yesterday's note (2/23/2025), there are no changes except as documented  above.    I spent greater than 54 minutes for chart review, obtaining history independently, performing medically appropriate examination,  documenting , ordering medications, tests, or procedures, referring and communicating with other health care professionals, Independently interpreting results and communicating results with patient/family/caregiver. More than 50% of time was spent in face-to-face clinical encounter.

## 2025-02-24 NOTE — PROGRESS NOTES
Bedside report received from off going RN/tech: Noah, assumed care of patient.     Fall Risk Score: MODERATE RISK  Fall risk interventions in place: Provide patient/family education based on risk assessment, Educate patient/family to call staff for assistance when getting out of bed, Place fall precaution signage outside patient door, Place patient in room close to nursing station, Utilize bed/chair fall alarm, and Bed alarm connected correctly  Bed type: Regular (Will Score less than 17 interventions in place)  Patient on cardiac monitor: Yes- SR  IVF/IV medications: Not Applicable   Oxygen: Room Air  Bedside sitter: Not Applicable   Isolation: Not applicable

## 2025-02-24 NOTE — CARE PLAN
The patient is Stable - Low risk of patient condition declining or worsening    Shift Goals  Clinical Goals: Safety, free from falls, reduce anxiety, trend labs  Patient Goals: Safety  Family Goals: JORDAN    Progress made toward(s) clinical / shift goals:    Problem: Knowledge Deficit - Standard  Goal: Patient and family/care givers will demonstrate understanding of plan of care, disease process/condition, diagnostic tests and medications  Outcome: Progressing     Problem: Fall Risk  Goal: Patient will remain free from falls  Outcome: Progressing  Note: 1. Assess for fall risk factors 2. Implement fall precautions      Problem: Psychosocial  Goal: Patient's ability to verbalize feelings about condition will improve  Outcome: Progressing  Note: 1. Discuss coping with medical condition and its effects 2. Encourage patient participation in care 3. Encourage acknowledgement of body changes and accompanying emotions 4. Perform depression screening        Patient is not progressing towards the following goals:

## 2025-02-25 PROBLEM — N18.31 ACUTE RENAL FAILURE WITH ACUTE TUBULAR NECROSIS SUPERIMPOSED ON STAGE 3A CHRONIC KIDNEY DISEASE (HCC): Status: ACTIVE | Noted: 2025-02-20

## 2025-02-25 PROBLEM — F06.0 PSYCHOTIC DISORDER DUE TO MEDICAL CONDITION WITH HALLUCINATIONS: Status: ACTIVE | Noted: 2025-02-23

## 2025-02-25 PROBLEM — N17.0 ACUTE RENAL FAILURE WITH ACUTE TUBULAR NECROSIS SUPERIMPOSED ON STAGE 3B CHRONIC KIDNEY DISEASE (HCC): Status: ACTIVE | Noted: 2025-02-20

## 2025-02-25 PROBLEM — N18.32 ACUTE RENAL FAILURE WITH ACUTE TUBULAR NECROSIS SUPERIMPOSED ON STAGE 3B CHRONIC KIDNEY DISEASE (HCC): Status: ACTIVE | Noted: 2025-02-20

## 2025-02-25 LAB
ANION GAP SERPL CALC-SCNC: 15 MMOL/L (ref 7–16)
ANION GAP SERPL CALC-SCNC: 16 MMOL/L (ref 7–16)
BUN SERPL-MCNC: 61 MG/DL (ref 8–22)
BUN SERPL-MCNC: 65 MG/DL (ref 8–22)
CALCIUM SERPL-MCNC: 8.4 MG/DL (ref 8.5–10.5)
CALCIUM SERPL-MCNC: 8.6 MG/DL (ref 8.5–10.5)
CHLORIDE SERPL-SCNC: 95 MMOL/L (ref 96–112)
CHLORIDE SERPL-SCNC: 96 MMOL/L (ref 96–112)
CO2 SERPL-SCNC: 22 MMOL/L (ref 20–33)
CO2 SERPL-SCNC: 24 MMOL/L (ref 20–33)
CREAT SERPL-MCNC: 2.82 MG/DL (ref 0.5–1.4)
CREAT SERPL-MCNC: 3.35 MG/DL (ref 0.5–1.4)
GFR SERPLBLD CREATININE-BSD FMLA CKD-EPI: 20 ML/MIN/1.73 M 2
GFR SERPLBLD CREATININE-BSD FMLA CKD-EPI: 25 ML/MIN/1.73 M 2
GLUCOSE BLD STRIP.AUTO-MCNC: 110 MG/DL (ref 65–99)
GLUCOSE BLD STRIP.AUTO-MCNC: 164 MG/DL (ref 65–99)
GLUCOSE BLD STRIP.AUTO-MCNC: 172 MG/DL (ref 65–99)
GLUCOSE BLD STRIP.AUTO-MCNC: 80 MG/DL (ref 65–99)
GLUCOSE SERPL-MCNC: 113 MG/DL (ref 65–99)
GLUCOSE SERPL-MCNC: 94 MG/DL (ref 65–99)
NT-PROBNP SERPL IA-MCNC: 339 PG/ML (ref 0–125)
POTASSIUM SERPL-SCNC: 3.9 MMOL/L (ref 3.6–5.5)
POTASSIUM SERPL-SCNC: 4.5 MMOL/L (ref 3.6–5.5)
SODIUM SERPL-SCNC: 134 MMOL/L (ref 135–145)
SODIUM SERPL-SCNC: 134 MMOL/L (ref 135–145)

## 2025-02-25 PROCEDURE — 82962 GLUCOSE BLOOD TEST: CPT | Mod: 91

## 2025-02-25 PROCEDURE — 700111 HCHG RX REV CODE 636 W/ 250 OVERRIDE (IP): Performed by: HOSPITALIST

## 2025-02-25 PROCEDURE — 99232 SBSQ HOSP IP/OBS MODERATE 35: CPT | Performed by: INTERNAL MEDICINE

## 2025-02-25 PROCEDURE — 700105 HCHG RX REV CODE 258: Performed by: STUDENT IN AN ORGANIZED HEALTH CARE EDUCATION/TRAINING PROGRAM

## 2025-02-25 PROCEDURE — 83880 ASSAY OF NATRIURETIC PEPTIDE: CPT

## 2025-02-25 PROCEDURE — 700102 HCHG RX REV CODE 250 W/ 637 OVERRIDE(OP): Performed by: HOSPITALIST

## 2025-02-25 PROCEDURE — 80048 BASIC METABOLIC PNL TOTAL CA: CPT

## 2025-02-25 PROCEDURE — 700102 HCHG RX REV CODE 250 W/ 637 OVERRIDE(OP): Performed by: STUDENT IN AN ORGANIZED HEALTH CARE EDUCATION/TRAINING PROGRAM

## 2025-02-25 PROCEDURE — 99231 SBSQ HOSP IP/OBS SF/LOW 25: CPT | Mod: GC | Performed by: STUDENT IN AN ORGANIZED HEALTH CARE EDUCATION/TRAINING PROGRAM

## 2025-02-25 PROCEDURE — 36415 COLL VENOUS BLD VENIPUNCTURE: CPT

## 2025-02-25 PROCEDURE — A9270 NON-COVERED ITEM OR SERVICE: HCPCS | Performed by: STUDENT IN AN ORGANIZED HEALTH CARE EDUCATION/TRAINING PROGRAM

## 2025-02-25 PROCEDURE — 700111 HCHG RX REV CODE 636 W/ 250 OVERRIDE (IP): Mod: JZ | Performed by: INTERNAL MEDICINE

## 2025-02-25 PROCEDURE — 770001 HCHG ROOM/CARE - MED/SURG/GYN PRIV*

## 2025-02-25 PROCEDURE — 99233 SBSQ HOSP IP/OBS HIGH 50: CPT | Performed by: STUDENT IN AN ORGANIZED HEALTH CARE EDUCATION/TRAINING PROGRAM

## 2025-02-25 PROCEDURE — A9270 NON-COVERED ITEM OR SERVICE: HCPCS | Performed by: HOSPITALIST

## 2025-02-25 RX ORDER — AMLODIPINE BESYLATE 5 MG/1
5 TABLET ORAL DAILY
Status: DISCONTINUED | OUTPATIENT
Start: 2025-02-26 | End: 2025-02-26 | Stop reason: HOSPADM

## 2025-02-25 RX ORDER — SODIUM CHLORIDE 9 MG/ML
INJECTION, SOLUTION INTRAVENOUS CONTINUOUS
Status: ACTIVE | OUTPATIENT
Start: 2025-02-25 | End: 2025-02-25

## 2025-02-25 RX ADMIN — SODIUM ZIRCONIUM CYCLOSILICATE 10 G: 10 POWDER, FOR SUSPENSION ORAL at 11:15

## 2025-02-25 RX ADMIN — HEPARIN SODIUM 5000 UNITS: 5000 INJECTION, SOLUTION INTRAVENOUS; SUBCUTANEOUS at 04:25

## 2025-02-25 RX ADMIN — METOPROLOL TARTRATE 25 MG: 25 TABLET, FILM COATED ORAL at 17:50

## 2025-02-25 RX ADMIN — HEPARIN SODIUM 5000 UNITS: 5000 INJECTION, SOLUTION INTRAVENOUS; SUBCUTANEOUS at 21:11

## 2025-02-25 RX ADMIN — FUROSEMIDE 20 MG: 10 INJECTION, SOLUTION INTRAVENOUS at 04:25

## 2025-02-25 RX ADMIN — INSULIN GLARGINE-YFGN 12 UNITS: 100 INJECTION, SOLUTION SUBCUTANEOUS at 17:52

## 2025-02-25 RX ADMIN — INSULIN LISPRO 2 UNITS: 100 INJECTION, SOLUTION INTRAVENOUS; SUBCUTANEOUS at 11:22

## 2025-02-25 RX ADMIN — METOPROLOL TARTRATE 25 MG: 25 TABLET, FILM COATED ORAL at 04:26

## 2025-02-25 RX ADMIN — AMLODIPINE BESYLATE 10 MG: 10 TABLET ORAL at 04:26

## 2025-02-25 RX ADMIN — ARIPIPRAZOLE 2 MG: 2 TABLET ORAL at 17:50

## 2025-02-25 RX ADMIN — INSULIN LISPRO 2 UNITS: 100 INJECTION, SOLUTION INTRAVENOUS; SUBCUTANEOUS at 21:12

## 2025-02-25 RX ADMIN — SODIUM CHLORIDE: 9 INJECTION, SOLUTION INTRAVENOUS at 11:19

## 2025-02-25 RX ADMIN — ATORVASTATIN CALCIUM 10 MG: 10 TABLET, FILM COATED ORAL at 04:26

## 2025-02-25 ASSESSMENT — ENCOUNTER SYMPTOMS
NAUSEA: 0
SINUS PAIN: 0
FEVER: 0
CHILLS: 0
ORTHOPNEA: 0
COUGH: 0
VOMITING: 0
FLANK PAIN: 0
WHEEZING: 0
PALPITATIONS: 0
DIZZINESS: 0
MYALGIAS: 0
HEADACHES: 0
WEIGHT LOSS: 0
ABDOMINAL PAIN: 0
SHORTNESS OF BREATH: 0
EYES NEGATIVE: 1
DIARRHEA: 0
HALLUCINATIONS: 0
HEMOPTYSIS: 0

## 2025-02-25 ASSESSMENT — PAIN DESCRIPTION - PAIN TYPE: TYPE: ACUTE PAIN

## 2025-02-25 ASSESSMENT — FIBROSIS 4 INDEX: FIB4 SCORE: 1.23

## 2025-02-25 NOTE — PROGRESS NOTES
After personally assessing the patient's fall risk I believe the patient to be safe to ambulate on his own. I have disconnected the bed alarm, discontinued the tele sitter, and have alerted the CNA to the patient's new privileges.

## 2025-02-25 NOTE — PROGRESS NOTES
Bedside report received from off going RN/tech: Alisha, assumed care of patient.     Fall Risk Score: HIGH RISK  Fall risk interventions in place: Place yellow fall risk ID band on patient, Provide patient/family education based on risk assessment, Educate patient/family to call staff for assistance when getting out of bed, Place fall precaution signage outside patient door, Place patient in room close to nursing station, Utilize bed/chair fall alarm, Notify charge of high risk for huddle, and Bed alarm connected correctly  Bed type: Regular (Will Score less than 17 interventions in place)  Patient on cardiac monitor: Yes  IVF/IV medications: Not Applicable   Oxygen: Room Air  Bedside sitter:  tele sitter   Isolation: Not applicable

## 2025-02-25 NOTE — PROGRESS NOTES
Hospital Medicine Daily Progress Note    Date of Service  2/25/2025    Chief Complaint  Juan Almaguer is a 59 y.o. male admitted 2/23/2025 with hyperkalemia    Hospital Course  Juan Almaguer is a 59 y.o. male who presented 2/23/2025 with history of type 2 diabetes, hypertension, dyslipidemia, hyperkalemia, distant history of stimulant abuse.     Patient was just in the hospital having been admitted for acute on chronic kidney disease and hyperkalemia.  He left yesterday morning, after having had hallucinations of rain inside his room and people outside the [facility] trying to get him.  He reports he was unable to sleep for the 48 hours he was here.  He comes back today with his brother, stating that he knows he needs to be in the hospital and is going to try and stay.     On presentation initial labs show potassium of 7.4 CO2 of 20 glucose 102 creatinine 2.28 BUN 51.  After treatment for hyperkalemia, repeat labs shows potassium of 5.2 with creatinine 2.14.  Patient is being admitted for treatment of hyperkalemia, acute on chronic kidney disease further evaluation of his hallucinations    Interval Problem Update  I have seen and examined patient at bedside    He reported visual hallucination last night, but not this morning  Worsening AMADO-Lasix dose was decreased per nephro    Potassium of 4.4, continue Lokelma    Above per previous hospitalist.    2/25/2025  Patient was seen and examined on the telemetry floor.  Continues to continuous cardiac monitoring.  Potassium stable at 4.5.  Continuing on Lokelma.  Creatinine increasing to 2.82 from 2.76. Urine output of 1.1 L yesterday.   Discussed with Dr. Penny of nephrology.  I ordered 1 L NS as well as bladder scan.  Holding furosemide.  Continue to monitor renal function.  Discussed with psychiatry.  Continuing Abilify.  No further hallucinations at this time.    I reviewed patient's outpatient nephrology clinic visit note on 1/29/2025.  Patient has a  history of stage III chronic kidney disease.  Followed by Dr. Najjar.  Was previously started on SGL 2 inhibitor.  Continued on ACE inhibitor.    I reviewed psychiatry consultation note from yesterday.  Concern for hallucinations due to general medical condition.    I have discussed this patient's plan of care and discharge plan at IDT rounds today with Case Management, Nursing, Nursing leadership, and other members of the IDT team.    Consultants/Specialty  Psychiatry, nephrology    Code Status  Full Code    Disposition  The patient is not medically cleared for discharge to home or a post-acute facility.  Anticipate discharge to: home with close outpatient follow-up    I have placed the appropriate orders for post-discharge needs.    Review of Systems  Review of Systems   Constitutional:  Negative for chills and fever.   Respiratory:  Negative for cough and shortness of breath.    Cardiovascular:  Negative for chest pain and palpitations.   Gastrointestinal:  Negative for abdominal pain, nausea and vomiting.   Musculoskeletal:  Negative for joint pain and myalgias.   Neurological:  Negative for dizziness and headaches.   Psychiatric/Behavioral:  Negative for hallucinations.         Physical Exam  Temp:  [36.6 °C (97.9 °F)-37.1 °C (98.8 °F)] 36.6 °C (97.9 °F)  Pulse:  [64-73] 66  Resp:  [16-18] 16  BP: (109-131)/(57-77) 114/57  SpO2:  [91 %-95 %] 95 %    Physical Exam  Vitals and nursing note reviewed.   Constitutional:       General: He is not in acute distress.     Appearance: He is ill-appearing.   HENT:      Head: Normocephalic and atraumatic.      Mouth/Throat:      Mouth: Mucous membranes are moist.      Pharynx: Oropharynx is clear. No oropharyngeal exudate.   Eyes:      General: No scleral icterus.        Right eye: No discharge.         Left eye: No discharge.      Conjunctiva/sclera: Conjunctivae normal.   Cardiovascular:      Rate and Rhythm: Normal rate and regular rhythm.      Pulses: Normal pulses.       Heart sounds: Normal heart sounds. No murmur heard.  Pulmonary:      Effort: Pulmonary effort is normal. No respiratory distress.      Breath sounds: Normal breath sounds.   Abdominal:      General: Abdomen is flat. Bowel sounds are normal. There is no distension.      Palpations: Abdomen is soft.   Musculoskeletal:         General: No swelling.      Cervical back: Neck supple. No tenderness.      Right lower leg: No edema.      Left lower leg: No edema.   Skin:     General: Skin is warm and dry.      Coloration: Skin is pale.   Neurological:      Mental Status: He is alert and oriented to person, place, and time. Mental status is at baseline.      Motor: No weakness.   Psychiatric:         Thought Content: Thought content normal.         Judgment: Judgment normal.         Fluids    Intake/Output Summary (Last 24 hours) at 2/25/2025 1302  Last data filed at 2/25/2025 1204  Gross per 24 hour   Intake 480 ml   Output 500 ml   Net -20 ml        Laboratory  Recent Labs     02/23/25  0530   WBC 7.9   RBC 4.68*   HEMOGLOBIN 14.2   HEMATOCRIT 41.2*   MCV 88.0   MCH 30.3   MCHC 34.5   RDW 42.6   PLATELETCT 222   MPV 11.3     Recent Labs     02/24/25  0615 02/24/25  1756 02/25/25  0601   SODIUM 136 135 134*   POTASSIUM 4.4 4.4 4.5   CHLORIDE 97 96 95*   CO2 24 24 24   GLUCOSE 94 151* 94   BUN 53* 60* 61*   CREATININE 2.60* 2.76* 2.82*   CALCIUM 8.5 8.8 8.6                   Imaging  No orders to display        Assessment/Plan  * Hyperkalemia- (present on admission)  Assessment & Plan  Patient presents with hyperkalemia due to his acute on chronic kidney injury  He has been treated with the hyperkalemia protocol here in the ED and his initial K of 7.4 is now down to 5.2  We will bolus 2 L of normal saline, and give 80 mg IV of Lasix  Nephrology consulted    2/24 K 7>5>4.4  Continue Lokelma  Continue to monitor    2/25/2025  Potassium stable at 4.5.  Continuing on Lokelma.  Continues cardiac monitoring to monitor for  arrhythmias.      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  He reports going back perhaps 6 months.  For the moment we will temporize with Ativan  Consult psychiatry  I have asked nursing and lab staff to try not to disturb the patient at night so that he may get 8 hours of restful sleep  He reports that he stopped using drugs 6 months ago    2/24 continue Abilify and melatonin per psych  I reviewed the psych notes    2/25/2025  No hallucinations this morning.  Continue Abilify and melatonin as per psychiatry recommendations.  I discussed with Dr. Christine Lagos, psychiatry resident.    Mixed hyperlipidemia- (present on admission)  Assessment & Plan  Statin    Primary hypertension- (present on admission)  Assessment & Plan  Continue metoprolol 25 mg twice daily, and amlodipine 10 mg  Monitor and titrate    2/25/2025  Controlled.  Continue metoprolol 25 mg about twice daily and amlodipine 10 mg daily    Type 2 diabetes mellitus with hyperglycemia, without long-term current use of insulin (HCC)- (present on admission)  Assessment & Plan  Patient is on semaglutide and 15 units of Lantus as an outpatient  Last A1c was 5.52 days ago  We will cover the sliding scale for the moment  Monitor blood glucose  Hypoglycemia protocol in place  Given his renal function and SGLT2i would be appropriate    2/25/2025  Decrease glargine insulin to 12 units from 15 units daily due to hypoglycemia.  Blood glucose goal 140-180.  Continue lispro insulin sliding scale  Hypoglycemia protocol  Diabetic renal diet    Acute renal failure with acute tubular necrosis superimposed on stage 3b chronic kidney disease (HCC)- (present on admission)  Assessment & Plan  Acute on probable chronic kidney disease  Started on IV Lasix  Nephrology consulted    2/24 worsening AMADO today  Cre 2.1>2.6  IV Lasix is decreased from 100 mg twice daily to 20  mg twice daily per nephro  Continue to monitor  Renal dose medications  Avoid nephrotoxic agent    2/25/2025  Baseline creatinine of 1.5 in May 2023.  GFR of 53 previously.  Creatinine increasing to 2.82 from 2.76.   Urine output of 1.1 L yesterday.   Discussed with Dr. Penny of nephrology.  I ordered 1 L NS as well as bladder scan.  Holding furosemide.             VTE prophylaxis:    heparin ppx        I have performed a physical exam and reviewed and updated ROS and Plan today (2/25/2025). In review of yesterday's note (2/24/2025), there are no changes except as documented above.    51 minutes spent prepping to see patient (e.g. review of tests) obtaining and/or reviewing separately obtained history. Performing a medically appropriate examination and evaluation.  Counseling and educating the patient/family/caregiver.  Ordering medications, tests, or procedures.  Referring and communicating with other health care professionals.  Documenting clinical information in EPIC.  Independently interpreting results and communicating results to patient/family/caregiver.  Care coordination.

## 2025-02-25 NOTE — PROGRESS NOTES
Bedside report received from off going RN/tech: Hai, assumed care of patient.     Fall Risk Score: NO RISK  Fall risk interventions in place: Utilize bed/chair fall alarm, Tele-sitter, and Bed alarm connected correctly  Bed type: Regular (Will Score less than 17 interventions in place)  Patient on cardiac monitor: Yes  IVF/IV medications: Not Applicable   Oxygen: Room Air  Bedside sitter: Not Applicable   Isolation: Not applicable

## 2025-02-25 NOTE — PROGRESS NOTES
Monitor Summary     Rhythm SR  HR Range 64-72  Ectopy R PVCs  NC/QRS/QT 0.14/0.08/0.40

## 2025-02-25 NOTE — DISCHARGE PLANNING
Case Management Discharge Planning    Admission Date: 2/23/2025  GMLOS: 2.6  ALOS: 2    6-Clicks ADL Score: 24  6-Clicks Mobility Score: 24      Anticipated Discharge Dispo: Discharge Disposition: Discharged to home/self care (01)  Discharge Address: [address] [place] [address] [address] [address]  Discharge Contact Phone Number: [phone]    DME Needed: Pending hospital course    Action(s) Taken: Bed side RN provided [facility] paperwork. LMSW submitted paperwork to [facility] link.     Escalations Completed: None    Medically Clear: No

## 2025-02-25 NOTE — PROGRESS NOTES
Nephrology Daily Progress Note    Date of Service  2/25/2025    Chief Complaint  59 y.o. male with CKD IIIb, admitted 2/23/2025 with AMADO, hyperkalemia    Interval Problem Update  2/24 -doing well, no complaints  No acute events  Baseline creat level at 2.0-2.2 -worse to 2.6  2/25 -doing well, no complaints  No acute events  Creat level worse 2.6-2.8!  Review of Systems  Review of Systems   Constitutional:  Negative for chills, fever, malaise/fatigue and weight loss.   HENT:  Negative for congestion, hearing loss and sinus pain.    Eyes: Negative.    Respiratory:  Negative for cough, hemoptysis, shortness of breath and wheezing.    Cardiovascular:  Negative for chest pain, palpitations, orthopnea and leg swelling.   Gastrointestinal:  Negative for abdominal pain, diarrhea, nausea and vomiting.   Genitourinary:  Negative for dysuria, flank pain, frequency, hematuria and urgency.   Skin: Negative.    All other systems reviewed and are negative.       Physical Exam  Temp:  [36.6 °C (97.9 °F)-37.1 °C (98.8 °F)] 36.6 °C (97.9 °F)  Pulse:  [64-73] 66  Resp:  [16-18] 16  BP: (109-131)/(57-77) 114/57  SpO2:  [91 %-95 %] 95 %    Physical Exam  Vitals reviewed.   Constitutional:       General: He is not in acute distress.     Appearance: Normal appearance. He is well-developed. He is not diaphoretic.   HENT:      Head: Normocephalic and atraumatic.      Nose: Nose normal.      Mouth/Throat:      Mouth: Mucous membranes are moist.      Pharynx: Oropharynx is clear.   Eyes:      Extraocular Movements: Extraocular movements intact.      Conjunctiva/sclera: Conjunctivae normal.      Pupils: Pupils are equal, round, and reactive to light.   Cardiovascular:      Rate and Rhythm: Normal rate and regular rhythm.      Pulses: Normal pulses.      Heart sounds: Normal heart sounds.   Pulmonary:      Effort: Pulmonary effort is normal. No respiratory distress.      Breath sounds: Normal breath sounds. No wheezing or rales.   Abdominal:       General: Bowel sounds are normal. There is no distension.      Palpations: Abdomen is soft. There is no mass.      Tenderness: There is no abdominal tenderness. There is no right CVA tenderness or left CVA tenderness.   Musculoskeletal:      Cervical back: Normal range of motion and neck supple.      Right lower leg: No edema.      Left lower leg: No edema.   Skin:     General: Skin is warm.      Coloration: Skin is not pale.      Findings: No erythema or rash.   Neurological:      General: No focal deficit present.      Mental Status: He is alert and oriented to person, place, and time.      Cranial Nerves: No cranial nerve deficit.      Coordination: Coordination normal.   Psychiatric:         Mood and Affect: Mood normal.         Behavior: Behavior normal.         Thought Content: Thought content normal.         Judgment: Judgment normal.         Fluids    Intake/Output Summary (Last 24 hours) at 2/25/2025 1536  Last data filed at 2/25/2025 1400  Gross per 24 hour   Intake 720 ml   Output 500 ml   Net 220 ml       Laboratory  Recent Labs     02/23/25  0530   WBC 7.9   RBC 4.68*   HEMOGLOBIN 14.2   HEMATOCRIT 41.2*   MCV 88.0   MCH 30.3   MCHC 34.5   RDW 42.6   PLATELETCT 222   MPV 11.3     Recent Labs     02/24/25  0615 02/24/25  1756 02/25/25  0601   SODIUM 136 135 134*   POTASSIUM 4.4 4.4 4.5   CHLORIDE 97 96 95*   CO2 24 24 24   GLUCOSE 94 151* 94   BUN 53* 60* 61*   CREATININE 2.60* 2.76* 2.82*   CALCIUM 8.5 8.8 8.6         Recent Labs     02/25/25  0055   NTPROBNP 339*           Imaging  No orders to display        Assessment/Plan    59 y.o. male with over 30-year history of diabetes, CKD III who presented 2/23/2025 with visual hallucinations, found to have hyperkalemia.     1.AMADO/CKD IIIb - creat  worse to stop Lasix, keep well hydrated     To monitor    2. Electrolytes : hyperkalemia corrected -to monitor                            d/c Lokelma    3. HTN: low BP to monitor - d/c Lasix      Reduce  amlodipine dose    4. Anemia: Hb stable at normal range      REcs:  Stop Lasix  Amlodipine 5 mg daily  IVF  Low salt diet  Keep well hydrated  Avoid iv contrast  Daily BMP  Will follow  D/w

## 2025-02-25 NOTE — CARE PLAN
The patient is Stable - Low risk of patient condition declining or worsening    Shift Goals  Clinical Goals: safety, VSS, reduce anxiety  Patient Goals: rest/go home  Family Goals: finn    Progress made toward(s) clinical / shift goals:    Problem: Knowledge Deficit - Standard  Goal: Patient and family/care givers will demonstrate understanding of plan of care, disease process/condition, diagnostic tests and medications  Outcome: Progressing     Problem: Fall Risk  Goal: Patient will remain free from falls  Outcome: Progressing     Problem: Psychosocial  Goal: Patient's ability to verbalize feelings about condition will improve  Outcome: Progressing     Problem: Pain - Standard  Goal: Alleviation of pain or a reduction in pain to the patient’s comfort goal  Outcome: Progressing       Patient is not progressing towards the following goals:

## 2025-02-25 NOTE — CONSULTS
"PSYCHIATRIC FOLLOW UP:    Reason for Admission: hyperkalemia, AMADO on CKD and hallucinations  Reason for Consult: Psychosis  Source of Information: patient and RN  Supervising Physician: Dr. Shakila Perry  Legal status: not applicable    Subjective:  Patient is a 59 y.o. male with history of CKD who presents to the hospital for hyperkalemia and hallucinations. He had been admitted on 2/23 and left AMA on 2/24 after becoming paranoid seeing something in his room. After returning to the hospital, he stated that he had not experienced hallucinations in the hospital but had at home and this prompted him to come back. Patient agreed to be started on Abilify and melatonin.  Per nursing report, patient has been calm and stable and had not appeared to be responding to internal stimuli.  He had also been medication adherent and did not require as needed medications for agitation or anxiety.  This morning, patient was seen sitting in his chair wearing street close with his bag of belongings next to him.  He stated that today he is \"doing great\" and he is waiting for his brother to come.  He states that he is looking forward to going home.  He denies experiencing any auditory or visual hallucinations, paranoia, SI or HI.  He states that prior to getting treatment, he was seeing birds flying around and seeing pictures on the wall moving which was distressing but now he feels his thinking is more clear and he is tolerating the Abilify.  He acknowledged that he experiences the hallucinations while he is not taking his medications.  He reports that he does have access to his medications and ability to commute to doctors appointments.    Reviewed nursing notes. Patient did receive PRN melatonin last night. NO PRN ativan. Patient has not been agitated or aggressive.     Psychiatric Examination:   Vitals: /73   Pulse 65   Temp 36.7 °C (98.1 °F) (Temporal)   Resp 16   Ht 1.651 m (5' 5\")   Wt 78.5 kg (173 lb 1 oz)   " "SpO2 93%   BMI 28.80 kg/m²   Musculoskeletal: No abnormal movements noted  Appearance: well-developed, well-nourished, appears stated age, and fair hygiene, cooperative, engaged, friendly, and pleasant  Thoughts: Denies SI, denies HI, and no overt delusions noted, linear, coherent, and goal-oriented  Speech: regular rate, rhythm, volume, tone, and syntax  Mood: \"good\"  Affect: euthymic and congruent with mood  SI/HI: Denies SI and HI  Alert/Oriented: alert and oriented  Memory: no gross impairment in immediate, recent, or remote memory  Fund of Knowledge: adequate  Insight/Judgement into symptoms: good  Neurological Testing: MMSE performed and wnl    Medications (currently prescribed at [facility]):    Current Facility-Administered Medications:     furosemide (Lasix) injection 20 mg, 20 mg, Intravenous, BID DIURETIC, Dary Penny M.D., 20 mg at 02/25/25 0425    sodium zirconium cyclosilicate (Lokelma) packet 10 g, 10 g, Oral, DAILY AT NOON, Haroon Castellanos D.O., 10 g at 02/24/25 1235    acetaminophen (Tylenol) tablet 650 mg, 650 mg, Oral, Q6HRS PRN, Haroon Castellanos D.O., 650 mg at 02/24/25 2222    amLODIPine (Norvasc) tablet 10 mg, 10 mg, Oral, DAILY, Haroon Castellanos, D.O., 10 mg at 02/25/25 0426    aspirin EC tablet 81 mg, 81 mg, Oral, MO, WE + FR, Haroon Castellanos D.O., 81 mg at 02/24/25 0929    atorvastatin (Lipitor) tablet 10 mg, 10 mg, Oral, DAILY, Haroon Castellanos D.O., 10 mg at 02/25/25 0426    insulin GLARGINE (Lantus,Semglee) injection, 15 Units, Subcutaneous, Q EVENING, Haroon Castellanos D.O., 15 Units at 02/24/25 1740    metoprolol tartrate (Lopressor) tablet 25 mg, 25 mg, Oral, BID, Haroon Castellanos D.O., 25 mg at 02/25/25 0426    Respiratory Therapy Consult, , Nebulization, Continuous RT, Haroon Castellanos D.O.    heparin injection 5,000 Units, 5,000 Units, Subcutaneous, Q8HRS, Haroon Castellanos D.O., 5,000 Units at 02/25/25 0425    ondansetron " (Zofran) syringe/vial injection 4 mg, 4 mg, Intravenous, Q4HRS PRN, Haroon Castellanos D.O.    ondansetron (Zofran ODT) dispertab 4 mg, 4 mg, Oral, Q4HRS PRN, Haroon Castellanos D.O.    promethazine (Phenergan) tablet 12.5-25 mg, 12.5-25 mg, Oral, Q4HRS PRN, CORRIE Salinas.O.    promethazine (Phenergan) suppository 12.5-25 mg, 12.5-25 mg, Rectal, Q4HRS PRN, CORRIE Salinas.O.    prochlorperazine (Compazine) injection 5-10 mg, 5-10 mg, Intravenous, Q4HRS PRN, CORRIE Salinas.O.    insulin lispro (HumaLOG,AdmeLOG) subcutaneous injection, 2-9 Units, Subcutaneous, 4X/DAY ACHS, 2 Units at 02/24/25 2019 **AND** POC blood glucose manual result, , , Q AC AND BEDTIME(S) **AND** [COMPLETED] NOTIFY MD and PharmD, , , Once **AND** Administer 20 grams of glucose (approximately 8 ounces of fruit juice) every 15 minutes PRN FSBG less than 70 mg/dL, , , PRN **AND** dextrose 50% (D50W) injection 25 g, 25 g, Intravenous, Q15 MIN PRN, CORRIE Salinas.O.    LORazepam (Ativan) injection 2 mg, 2 mg, Intravenous, Q4HRS PRN, Haroon Castellanos D.O., 2 mg at 02/23/25 2135    ARIPiprazole (Abilify) tablet 2 mg, 2 mg, Oral, Q EVENING, Shakila Perry D.O., 2 mg at 02/24/25 1744    melatonin tablet 5 mg, 5 mg, Oral, HS PRN, Shakila Perry D.O., 5 mg at 02/24/25 2314  Labs:  Recent Labs     02/23/25  0530   WBC 7.9   RBC 4.68*   HEMOGLOBIN 14.2   HEMATOCRIT 41.2*   MCV 88.0   MCH 30.3   MCHC 34.5   RDW 42.6   PLATELETCT 222   MPV 11.3     Recent Labs     02/24/25  0615 02/24/25  1756 02/25/25  0601   SODIUM 136 135 134*   POTASSIUM 4.4 4.4 4.5   CHLORIDE 97 96 95*   CO2 24 24 24   GLUCOSE 94 151* 94   BUN 53* 60* 61*   CREATININE 2.60* 2.76* 2.82*   CALCIUM 8.5 8.8 8.6        Results for orders placed or performed during the hospital encounter of 02/23/25   EKG (NOW)   Result Value Ref Range    Report       [facility] Emergency Dept.    Test Date:   2025  Pt Name:    TATY WEINBERG             Department: ER  MRN:        [id number]                      Room:       GR 27  Gender:     Male                         Technician: [id number]  :        1965                   Requested By:DERIK PAUL  Order #:    [id number]                    Reading MD: DERIK PAUL, DO    Measurements  Intervals                                Axis  Rate:       69                           P:          -15  NM:         141                          QRS:        -15  QRSD:       111                          T:          46  QT:         395  QTc:        423    Interpretive Statements  Sinus rhythm  Borderline left axis deviation  Borderline ST elevation, anterior leads  Compared to ECG 2025 13:30:31  No significant changes  Electronically Signed On 2025 08:34:08 PST by DERIK PAUL,      EKG   Result Value Ref Range    Report       [facility] Emergency Dept.    Test Date:  2025  Pt Name:    TATY WEINBERG             Department: ER  MRN:        [id number]                      Room:       [facility]  Gender:     Male                         Technician: [id number]  :        1965                   Requested By:DERIK PAUL  Order #:    [id number]                    Reading MD: DERIK PAUL, DO    Measurements  Intervals                                Axis  Rate:       79                           P:          14  NM:         131                          QRS:        -11  QRSD:       116                          T:          43  QT:         372  QTc:        427    Interpretive Statements  Sinus rhythm  Incomplete left bundle branch block  Probable left ventricular hypertrophy  Anterior ST elevation, probably due to LVH  Compared to ECG 2025 05:23:34  Left bundle-branch block now present  Left ventricular hypertrophy now present  ST (T wave) deviation still present  Electronically Sig marilou On 2025 08:34:09 PST by DERIK TRAMMELL  DO BEVERLY       Imaging:  Abdominal and Pelvic CT 2/20: No hydronephrosis noted.  Presence of cholelithiasis, atherosclerosis and a circumscribed lesion in the left gluteal soft tissue, likely a cyst or hematoma per radiology note       Assessment:  Patient is a 59-year-old male with history of AMADO on CKD and past history of polysubstance abuse including methamphetamines who is being hospitalized for hyperkalemia, AMADO and hallucinations.  Given the onset of hallucinations while patient is nonadherent with medications, it is likely that he is experiencing a state of delirium.  He is currently adherent to treatment and amenable to staying in the hospital and has been showing good response to Abilify and denying AVH/SI/HI. He also shows good insight into the onset of hallucinations likely coinciding with medication non-adherence. He may benefit from continuing Abilify 2mg daily and following up with outpatient psychiatry for further management and he was given a behavioral health resource list.    Diagnosis  Visual hallucinations secondary to medical condition (could be due to mild metabolic encephalopathy, patient also may have vision deficits which may predispose him to having visual hallucinations)    Plan:  -Legal status: Not applicable at this time, however if the patient again wants to leave AMA would consider placing patient on legal hold and reconsulting psychiatry to evaluate legal hold as psychotic symptoms may be interfering with patient's ability to make rational medical decisions at this time.  Currently patient is amenable to remaining in the hospital for treatment, therefore psychotic symptoms do not appear to be interfering with patient's ability to make medical decisions.  -Continue Abilify 2 mg nightly for psychosis  -Continue melatonin 5 mg at bedtime as needed for insomnia  - Reviewed risks, benefits, alternatives to include no treatment, therapy and medications    -will continue to follow

## 2025-02-25 NOTE — CARE PLAN
The patient is Stable - Low risk of patient condition declining or worsening    Shift Goals  Clinical Goals: safety, VSS, reduce anxiety  Patient Goals: rest/go home  Family Goals: finn    Progress made toward(s) clinical / shift goals:    Problem: Knowledge Deficit - Standard  Goal: Patient and family/care givers will demonstrate understanding of plan of care, disease process/condition, diagnostic tests and medications  Description: Target End Date:  1-3 days or as soon as patient condition allows    Document in Patient Education    1.  Patient and family/caregiver oriented to unit, equipment, visitation policy and means for communicating concern  2.  Complete/review Learning Assessment  3.  Assess knowledge level of disease process/condition, treatment plan, diagnostic tests and medications  4.  Explain disease process/condition, treatment plan, diagnostic tests and medications  Outcome: Progressing     Problem: Fall Risk  Goal: Patient will remain free from falls  Description: Target End Date:  Prior to discharge or change in level of care    Document interventions on the [facility] Fall Risk Assessment    1.  Assess for fall risk factors  2.  Implement fall precautions  Outcome: Progressing     Problem: Pain - Standard  Goal: Alleviation of pain or a reduction in pain to the patient’s comfort goal  Description: Target End Date:  Prior to discharge or change in level of care    Document on Vitals flowsheet    1.  Document pain using the appropriate pain scale per order or unit policy  2.  Educate and implement non-pharmacologic comfort measures (i.e. relaxation, distraction, massage, cold/heat therapy, etc.)  3.  Pain management medications as ordered  4.  Reassess pain after pain med administration per policy  5.  If opiods administered assess patient's response to pain medication is appropriate per POSS sedation scale  6.  Follow pain management plan developed in collaboration with patient and  interdisciplinary team (including palliative care or pain specialists if applicable)  Outcome: Progressing       Patient is not progressing towards the following goals:

## 2025-02-26 ENCOUNTER — PATIENT OUTREACH (OUTPATIENT)
Dept: SCHEDULING | Facility: IMAGING CENTER | Age: 60
End: 2025-02-26
Payer: COMMERCIAL

## 2025-02-26 VITALS
TEMPERATURE: 97.6 F | RESPIRATION RATE: 18 BRPM | HEART RATE: 63 BPM | HEIGHT: 65 IN | WEIGHT: 174.82 LBS | BODY MASS INDEX: 29.13 KG/M2 | DIASTOLIC BLOOD PRESSURE: 72 MMHG | SYSTOLIC BLOOD PRESSURE: 144 MMHG | OXYGEN SATURATION: 97 %

## 2025-02-26 PROBLEM — E87.5 HYPERKALEMIA: Status: RESOLVED | Noted: 2025-02-22 | Resolved: 2025-02-26

## 2025-02-26 LAB
ANION GAP SERPL CALC-SCNC: 13 MMOL/L (ref 7–16)
BUN SERPL-MCNC: 62 MG/DL (ref 8–22)
CALCIUM SERPL-MCNC: 8.4 MG/DL (ref 8.5–10.5)
CHLORIDE SERPL-SCNC: 97 MMOL/L (ref 96–112)
CO2 SERPL-SCNC: 24 MMOL/L (ref 20–33)
CREAT SERPL-MCNC: 2.9 MG/DL (ref 0.5–1.4)
ERYTHROCYTE [DISTWIDTH] IN BLOOD BY AUTOMATED COUNT: 39.6 FL (ref 35.9–50)
GFR SERPLBLD CREATININE-BSD FMLA CKD-EPI: 24 ML/MIN/1.73 M 2
GLUCOSE BLD STRIP.AUTO-MCNC: 116 MG/DL (ref 65–99)
GLUCOSE SERPL-MCNC: 121 MG/DL (ref 65–99)
HCT VFR BLD AUTO: 36.1 % (ref 42–52)
HGB BLD-MCNC: 12.9 G/DL (ref 14–18)
MCH RBC QN AUTO: 30.6 PG (ref 27–33)
MCHC RBC AUTO-ENTMCNC: 35.7 G/DL (ref 32.3–36.5)
MCV RBC AUTO: 85.7 FL (ref 81.4–97.8)
PHOSPHATE SERPL-MCNC: 4.4 MG/DL (ref 2.5–4.5)
PLATELET # BLD AUTO: 231 K/UL (ref 164–446)
PMV BLD AUTO: 11.7 FL (ref 9–12.9)
POTASSIUM SERPL-SCNC: 4.2 MMOL/L (ref 3.6–5.5)
RBC # BLD AUTO: 4.21 M/UL (ref 4.7–6.1)
SODIUM SERPL-SCNC: 134 MMOL/L (ref 135–145)
WBC # BLD AUTO: 6.2 K/UL (ref 4.8–10.8)

## 2025-02-26 PROCEDURE — A9270 NON-COVERED ITEM OR SERVICE: HCPCS | Performed by: INTERNAL MEDICINE

## 2025-02-26 PROCEDURE — 700111 HCHG RX REV CODE 636 W/ 250 OVERRIDE (IP): Performed by: HOSPITALIST

## 2025-02-26 PROCEDURE — 80048 BASIC METABOLIC PNL TOTAL CA: CPT

## 2025-02-26 PROCEDURE — 700102 HCHG RX REV CODE 250 W/ 637 OVERRIDE(OP): Performed by: INTERNAL MEDICINE

## 2025-02-26 PROCEDURE — 99239 HOSP IP/OBS DSCHRG MGMT >30: CPT | Performed by: STUDENT IN AN ORGANIZED HEALTH CARE EDUCATION/TRAINING PROGRAM

## 2025-02-26 PROCEDURE — 84100 ASSAY OF PHOSPHORUS: CPT

## 2025-02-26 PROCEDURE — 82962 GLUCOSE BLOOD TEST: CPT

## 2025-02-26 PROCEDURE — 36415 COLL VENOUS BLD VENIPUNCTURE: CPT

## 2025-02-26 PROCEDURE — A9270 NON-COVERED ITEM OR SERVICE: HCPCS | Performed by: HOSPITALIST

## 2025-02-26 PROCEDURE — 85027 COMPLETE CBC AUTOMATED: CPT

## 2025-02-26 PROCEDURE — 700102 HCHG RX REV CODE 250 W/ 637 OVERRIDE(OP): Performed by: HOSPITALIST

## 2025-02-26 RX ORDER — ARIPIPRAZOLE 2 MG/1
2 TABLET ORAL EVERY EVENING
Qty: 30 TABLET | Refills: 2 | Status: SHIPPED | OUTPATIENT
Start: 2025-02-26 | End: 2025-02-26

## 2025-02-26 RX ORDER — AMLODIPINE BESYLATE 5 MG/1
5 TABLET ORAL DAILY
Qty: 30 TABLET | Refills: 2 | Status: SHIPPED | OUTPATIENT
Start: 2025-02-27

## 2025-02-26 RX ORDER — ARIPIPRAZOLE 2 MG/1
2 TABLET ORAL EVERY EVENING
Qty: 30 TABLET | Refills: 2 | Status: SHIPPED | OUTPATIENT
Start: 2025-02-26

## 2025-02-26 RX ORDER — AMLODIPINE BESYLATE 5 MG/1
5 TABLET ORAL DAILY
Qty: 30 TABLET | Refills: 2 | Status: SHIPPED | OUTPATIENT
Start: 2025-02-27 | End: 2025-02-26

## 2025-02-26 RX ADMIN — HEPARIN SODIUM 5000 UNITS: 5000 INJECTION, SOLUTION INTRAVENOUS; SUBCUTANEOUS at 05:10

## 2025-02-26 RX ADMIN — AMLODIPINE BESYLATE 5 MG: 5 TABLET ORAL at 05:10

## 2025-02-26 RX ADMIN — ASPIRIN 81 MG: 81 TABLET, COATED ORAL at 09:00

## 2025-02-26 RX ADMIN — ATORVASTATIN CALCIUM 10 MG: 10 TABLET, FILM COATED ORAL at 05:10

## 2025-02-26 RX ADMIN — METOPROLOL TARTRATE 25 MG: 25 TABLET, FILM COATED ORAL at 05:10

## 2025-02-26 ASSESSMENT — FIBROSIS 4 INDEX: FIB4 SCORE: 1.23

## 2025-02-26 NOTE — CARE PLAN
The patient is Stable - Low risk of patient condition declining or worsening    Shift Goals  Clinical Goals: VSS, monitor kidney fnx  Patient Goals: DC  Family Goals: finn    Progress made toward(s) clinical / shift goals:    Problem: Knowledge Deficit - Standard  Goal: Patient and family/care givers will demonstrate understanding of plan of care, disease process/condition, diagnostic tests and medications  Description: Target End Date:  1-3 days or as soon as patient condition allows    Document in Patient Education    1.  Patient and family/caregiver oriented to unit, equipment, visitation policy and means for communicating concern  2.  Complete/review Learning Assessment  3.  Assess knowledge level of disease process/condition, treatment plan, diagnostic tests and medications  4.  Explain disease process/condition, treatment plan, diagnostic tests and medications  Outcome: Progressing     Problem: Fall Risk  Goal: Patient will remain free from falls  Description: Target End Date:  Prior to discharge or change in level of care    Document interventions on the [facility] Fall Risk Assessment    1.  Assess for fall risk factors  2.  Implement fall precautions  Outcome: Progressing     Problem: Psychosocial  Goal: Patient's ability to verbalize feelings about condition will improve  Description: Target End Date:  Prior to discharge or change in level of care    1.  Discuss coping with medical condition and its effects  2.  Encourage patient participation in care  3.  Encourage acknowledgement of body changes and accompanying emotions  4.  Perform depression screening  Outcome: Progressing     Problem: Pain - Standard  Goal: Alleviation of pain or a reduction in pain to the patient’s comfort goal  Description: Target End Date:  Prior to discharge or change in level of care    Document on Vitals flowsheet    1.  Document pain using the appropriate pain scale per order or unit policy  2.  Educate and implement  non-pharmacologic comfort measures (i.e. relaxation, distraction, massage, cold/heat therapy, etc.)  3.  Pain management medications as ordered  4.  Reassess pain after pain med administration per policy  5.  If opiods administered assess patient's response to pain medication is appropriate per POSS sedation scale  6.  Follow pain management plan developed in collaboration with patient and interdisciplinary team (including palliative care or pain specialists if applicable)  Outcome: Progressing       Patient is not progressing towards the following goals:

## 2025-02-26 NOTE — DISCHARGE SUMMARY
Discharge Summary    CHIEF COMPLAINT ON ADMISSION  Chief Complaint   Patient presents with    Hallucinations     X yesterday afternoon        Reason for Admission  hallucinations     Admission Date  2/23/2025    CODE STATUS  Full Code    HPI & HOSPITAL COURSE  Juan Almaguer is a 59 y.o. male who presented 2/23/2025 with hallucinations and insomnia.  This is a pleasant gentleman with a history of diabetes mellitus type 2, hypertension, chronic kidney disease stage III for, dyslipidemia, hyperkalemia, and distant history of stimulant abuse.  Patient was recently hospitalized for acute renal failure on chronic kidney disease and hyperkalemia when he presented with abdominal pain, nausea, vomiting, and generalized weakness.  He left AGAINST MEDICAL ADVICE after having hallucinations of rain inside his room and the feeling that the people outside the hallway was trying to get him.  He stated that he was able to sleep for 48 hours while he was hospitalized.  He came back to the emergency room knowing that he needs to continue his treatment and was going to try to stay with his hospitalization.    In the emergency room, patient was noted to have a potassium of 7.4 with bicarb of 20.  Creatinine elevated 2.28 with BUN of 31.  He was given treatment for hyperkalemia improving his potassium to 5.2 and creatinine improving to 2.14.  He was admitted to the telemetry floor for further care and evaluation of ongoing hyperkalemia and acute renal failure on chronic kidney disease as well as ongoing hallucinations.    Patient was admitted to the telemetry for further care and evaluation.  Nephrology was consulted and the patient was started on lokelma and furosemide 100 mg IV twice daily.  Recommended discharging the patient on torsemide 40 mg about daily.  Did not recommend ACE inhibitor or ARB at this time.  He was given 1 L of bicarbonate fluids for his metabolic acidosis.  Patient's creatinine worst with ongoing IV  diuresis.  His IV furosemide was decreased in dose and then ultimately held.  He was given additional liter of NS with improvement of his creatinine levels, which stabilized to 2.90.  He was continued on amlodipine 5 mg daily with control of his blood pressures.  Due to worsening of his creatinine levels with ongoing diuresis with furosemide, he was not discharged with further diuretics.  Recommended close outpatient follow-up with primary care provider and nephrologist.  He was discharged to home as per his request.  He was also discharged with [facility].    During the course of patient's hospitalization, psychiatry was consulted for patient's hallucinations.  Patient stated that he had previously had similar hallucinations and episodes of paranoia in the past intermittently.  Per their evaluation, it was felt that the patient's visual hallucinations were secondary to his medical condition possibly due to mild metabolic encephalopathy.  He was started on Abilify 2 mg nightly as well as melatonin 5 mg at bedtime as needed for insomnia with resolution of his hallucinations.  He had no suicidal or homicidal ideations at the time of discharge.    Therefore, he is discharged in good and stable condition to home with close outpatient follow-up.    The patient met 2-midnight criteria for an inpatient stay at the time of discharge.    Discharge Date  2/26/2025    FOLLOW UP ITEMS POST DISCHARGE  -Follow-up primary care provider in 3 to 5 days with labs.    -Follow-up with [facility] nephrology in 1 week.    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 [facility] [address]     No follow-up provider specified.    MEDICATIONS ON DISCHARGE     Medication List        START taking these medications        Instructions   ARIPiprazole 2 MG tablet  Commonly known as: Abilify   Take 1 Tablet by mouth every evening.  Dose: 2 mg     sodium zirconium cyclosilicate 5 g packet  Commonly known as: Lokelma   Take 10 g by mouth every day.  Dose: 10 g            CHANGE how you take these medications        Instructions   amLODIPine 5 MG Tabs  Start taking on: February 27, 2025  What changed:   medication strength  how much to take  Commonly known as: Norvasc   Take 1 Tablet by mouth every day.  Dose: 5 mg            CONTINUE taking these medications        Instructions   acetaminophen 325 MG Tabs  Commonly known as: Tylenol   Take 650 mg by mouth every 6 hours as needed for Mild Pain or Fever. 2 tablets = 650 mg.  Dose: 650 mg     aspirin 81 MG EC tablet   Take 81 mg by mouth every Monday, Wednesday, and Friday.  Dose: 81 mg     atorvastatin 10 MG Tabs  Commonly known as: Lipitor   Take 10 mg by mouth every day.  Dose: 10 mg     insulin GLARGINE 100 UNIT/ML Soln  Commonly known as: Lantus,Semglee   Inject 15 Units under the skin every evening for 90 days.  Dose: 15 Units     metoprolol tartrate 25 MG Tabs  Commonly known as: Lopressor   Take 1 Tablet by mouth 2 times a day for 90 days.  Dose: 25 mg              Allergies  Allergies   Allergen Reactions    Penicillins Unspecified     Patient unsure of reaction       DIET  Orders Placed This Encounter   Procedures    Diet Order Diet: Renal; Second Modifier: (optional): Consistent CHO (Diabetic)     Standing Status:   Standing     Number of Occurrences:   1     Diet::   Renal [8]     Second Modifier: (optional):   Consistent CHO (Diabetic) [4]       ACTIVITY  As tolerated.  Weight  bearing as tolerated    CONSULTATIONS  Nephrology    PROCEDURES  None    LABORATORY  Lab Results   Component Value Date    SODIUM 134 (L) 02/26/2025    POTASSIUM 4.2 02/26/2025    CHLORIDE 97 02/26/2025    CO2 24 02/26/2025    GLUCOSE 121 (H) 02/26/2025    BUN 62 (H) 02/26/2025    CREATININE 2.90 (H) 02/26/2025        Lab Results   Component Value Date    WBC 6.2 02/26/2025    HEMOGLOBIN 12.9 (L) 02/26/2025    HEMATOCRIT 36.1 (L) 02/26/2025    PLATELETCT 231 02/26/2025        Total time of the discharge process 36 minutes.

## 2025-02-26 NOTE — PROGRESS NOTES
Bedside report received from off going RN/tech: Ag, assumed care of patient.     Fall Risk Score: NO RISK  Fall risk interventions in place: Provide patient/family education based on risk assessment, Educate patient/family to call staff for assistance when getting out of bed, Place fall precaution signage outside patient door, Place patient in room close to nursing station, Utilize bed/chair fall alarm, Notify charge of high risk for huddle, and Bed alarm connected correctly  Bed type: Regular (Will Score less than 17 interventions in place)  Patient on cardiac monitor: No  medical  IVF/IV medications: Not Applicable   Oxygen: Room Air  Bedside sitter: Not Applicable   Isolation: Not applicable

## 2025-02-26 NOTE — PROGRESS NOTES
Patient discharged. Patient stated he did not want to wait for his nutrition consult. Patient did not have any signs or symptoms of distress at time of discharge. Patient walked off floor.

## 2025-02-26 NOTE — PROGRESS NOTES
Monitor Summary  Rhythm: Normal Sinus Rhythm  Rate: 60-70s  Ectopy: PVCs  .14 / .08 / .39    12 hr Chart check.

## 2025-02-26 NOTE — DISCHARGE INSTRUCTIONS
Thank you for coming to [facility].  It has been my pleasure to take care of you!    -Please follow-up with your primary care provider in 3 to 5 days with labs.  -Follow-up with [facility] in 1 week.  -Avoid high potassium foods like tomatoes, bananas, and oranges.

## 2025-03-11 ENCOUNTER — OFFICE VISIT (OUTPATIENT)
Dept: NEPHROLOGY | Facility: MEDICAL CENTER | Age: 60
End: 2025-03-11
Payer: COMMERCIAL

## 2025-03-11 VITALS
DIASTOLIC BLOOD PRESSURE: 60 MMHG | HEART RATE: 65 BPM | OXYGEN SATURATION: 97 % | HEIGHT: 65 IN | WEIGHT: 180 LBS | TEMPERATURE: 97.5 F | SYSTOLIC BLOOD PRESSURE: 120 MMHG | BODY MASS INDEX: 29.99 KG/M2

## 2025-03-11 DIAGNOSIS — N18.32 STAGE 3B CHRONIC KIDNEY DISEASE: ICD-10-CM

## 2025-03-11 DIAGNOSIS — N18.32 TYPE 2 DIABETES MELLITUS WITH STAGE 3B CHRONIC KIDNEY DISEASE, WITH LONG-TERM CURRENT USE OF INSULIN (HCC): ICD-10-CM

## 2025-03-11 DIAGNOSIS — E87.5 HYPERKALEMIA: ICD-10-CM

## 2025-03-11 DIAGNOSIS — E11.22 TYPE 2 DIABETES MELLITUS WITH STAGE 3B CHRONIC KIDNEY DISEASE, WITH LONG-TERM CURRENT USE OF INSULIN (HCC): ICD-10-CM

## 2025-03-11 DIAGNOSIS — Z79.4 TYPE 2 DIABETES MELLITUS WITH STAGE 3B CHRONIC KIDNEY DISEASE, WITH LONG-TERM CURRENT USE OF INSULIN (HCC): ICD-10-CM

## 2025-03-11 DIAGNOSIS — R60.9 EDEMA, UNSPECIFIED TYPE: ICD-10-CM

## 2025-03-11 DIAGNOSIS — N17.9 AKI (ACUTE KIDNEY INJURY) (HCC): ICD-10-CM

## 2025-03-11 PROCEDURE — 99214 OFFICE O/P EST MOD 30 MIN: CPT | Performed by: INTERNAL MEDICINE

## 2025-03-11 PROCEDURE — 3074F SYST BP LT 130 MM HG: CPT | Performed by: INTERNAL MEDICINE

## 2025-03-11 PROCEDURE — G2211 COMPLEX E/M VISIT ADD ON: HCPCS | Performed by: INTERNAL MEDICINE

## 2025-03-11 PROCEDURE — 3078F DIAST BP <80 MM HG: CPT | Performed by: INTERNAL MEDICINE

## 2025-03-11 RX ORDER — FUROSEMIDE 40 MG/1
80 TABLET ORAL DAILY
Qty: 180 TABLET | Refills: 3 | Status: SHIPPED | OUTPATIENT
Start: 2025-03-11

## 2025-03-11 ASSESSMENT — FIBROSIS 4 INDEX: FIB4 SCORE: 1.19

## 2025-03-11 ASSESSMENT — ENCOUNTER SYMPTOMS
NAUSEA: 0
HYPERTENSION: 1
SHORTNESS OF BREATH: 0
COUGH: 0
CHILLS: 0
FEVER: 0
VOMITING: 0

## 2025-03-11 NOTE — PROGRESS NOTES
"Subjective     Juan Almaguer is a 59 y.o. male who presents with Hypertension and Chronic Kidney Disease            Hypertension  This is a chronic problem. The current episode started more than 1 year ago. The problem is unchanged. The problem is controlled. Associated symptoms include peripheral edema. Pertinent negatives include no chest pain, malaise/fatigue or shortness of breath. Risk factors for coronary artery disease include male gender and diabetes mellitus. Past treatments include beta blockers and calcium channel blockers. The current treatment provides significant improvement. There are no compliance problems.  Hypertensive end-organ damage includes kidney disease. Identifiable causes of hypertension include chronic renal disease.   Chronic Kidney Disease  This is a chronic problem. The current episode started more than 1 year ago. The problem occurs constantly. The problem has been rapidly worsening. Pertinent negatives include no chest pain, chills, coughing, fever, nausea, urinary symptoms or vomiting.       Review of Systems   Constitutional:  Negative for chills, fever and malaise/fatigue.   Respiratory:  Negative for cough and shortness of breath.    Cardiovascular:  Negative for chest pain and leg swelling.   Gastrointestinal:  Negative for nausea and vomiting.   Genitourinary:  Negative for dysuria, frequency and urgency.              Objective     /60 (BP Location: Right arm, Patient Position: Sitting, BP Cuff Size: Adult)   Pulse 65   Temp 36.4 °C (97.5 °F) (Temporal)   Ht 1.651 m (5' 5\")   Wt 81.6 kg (180 lb)   SpO2 97%   BMI 29.95 kg/m²      Physical Exam  Vitals and nursing note reviewed.   Constitutional:       General: He is not in acute distress.     Appearance: He is not ill-appearing.   HENT:      Head: Normocephalic and atraumatic.      Right Ear: External ear normal.      Left Ear: External ear normal.      Nose: Nose normal.   Eyes:      General:         Right " eye: No discharge.         Left eye: No discharge.      Conjunctiva/sclera: Conjunctivae normal.   Cardiovascular:      Rate and Rhythm: Normal rate and regular rhythm.      Heart sounds: No murmur heard.  Pulmonary:      Effort: Pulmonary effort is normal. No respiratory distress.      Breath sounds: Normal breath sounds. No wheezing.   Musculoskeletal:         General: No tenderness or deformity.      Right lower leg: Edema present.      Left lower leg: Edema present.   Skin:     General: Skin is warm.   Neurological:      General: No focal deficit present.      Mental Status: He is alert and oriented to person, place, and time.   Psychiatric:         Mood and Affect: Mood normal.         Behavior: Behavior normal.       Past Medical History:   Diagnosis Date    Diabetes ([facility])      Social History     Socioeconomic History    Marital status: Single     Spouse name: Not on file    Number of children: Not on file    Years of education: Not on file    Highest education level: Not on file   Occupational History    Not on file   Tobacco Use    Smoking status: Never    Smokeless tobacco: Never   Vaping Use    Vaping status: Never Used   Substance and Sexual Activity    Alcohol use: Not Currently    Drug use: Not Currently    Sexual activity: Not on file   Other Topics Concern    Not on file   Social History Narrative    Not on file     Social Drivers of Health     Financial Resource Strain: Not on file   Food Insecurity: No Food Insecurity (2/23/2025)    Hunger Vital Sign     Worried About Running Out of Food in the Last Year: Never true     Ran Out of Food in the Last Year: Never true   Recent Concern: Food Insecurity - Food Insecurity Present (2/20/2025)    Hunger Vital Sign     Worried About Running Out of Food in the Last Year: Sometimes true     Ran Out of Food in the Last Year: Sometimes true   Transportation Needs: No Transportation Needs (2/23/2025)    PRAPARE - Transportation     Lack of Transportation  (Medical): No     Lack of Transportation (Non-Medical): No   Physical Activity: Not on file   Stress: Not on file   Social Connections: Not on file   Intimate Partner Violence: Not At Risk (2/23/2025)    Humiliation, Afraid, Rape, and Kick questionnaire     Fear of Current or Ex-Partner: No     Emotionally Abused: No     Physically Abused: No     Sexually Abused: No   Housing Stability: High Risk (2/23/2025)    Housing Stability Vital Sign     Unable to Pay for Housing in the Last Year: No     Number of Times Moved in the Last Year: 2     Homeless in the Last Year: Yes     History reviewed. No pertinent family history.  Recent Labs     02/20/25  0834 02/21/25  0106 02/22/25  0559 02/25/25  0601 02/25/25  1804 02/26/25  0618   ALBUMIN 4.2 3.6  --   --   --   --    SODIUM 133* 137   < > 134* 134* 134*   POTASSIUM 5.5 5.8*   < > 4.5 3.9 4.2   CHLORIDE 103 109   < > 95* 96 97   CO2 17* 19*   < > 24 22 24   BUN 85* 77*   < > 61* 65* 62*   CREATININE 3.55* 3.03*   < > 2.82* 3.35* 2.90*   PHOSPHORUS  --   --   --   --   --  4.4    < > = values in this interval not displayed.     Lab from March 10, 2025 for reviewed showed creatinine 2.9 mg/dL, potassium 5.5                             Assessment & Plan  AMADO (acute kidney injury) (HCC)  Etiology is not very clear, possible progression of his CKD  No uremic symptoms  Avoid nephrotoxins  Recheck labs  Patient was advised to call us if symptoms worsen    Orders:    Basic Metabolic Panel; Future    CBC WITHOUT DIFFERENTIAL; Future    MICROALB/CREAT RATIO RAND. UR    Basic Metabolic Panel; Future    CBC WITHOUT DIFFERENTIAL; Future    MICROALB/CREAT RATIO RAND. UR    Stage 3b chronic kidney disease  Stable  No uremic symptoms  Renal dose of medication  Avoid nephrotoxins  Continue same medication regimen  Patient was advised to call us if symptoms worsen    Orders:    Basic Metabolic Panel; Future    CBC WITHOUT DIFFERENTIAL; Future    MICROALB/CREAT RATIO RAND. UR    Basic  Metabolic Panel; Future    CBC WITHOUT DIFFERENTIAL; Future    MICROALB/CREAT RATIO RAND. UR    Type 2 diabetes mellitus with stage 3b chronic kidney disease, with long-term current use of insulin (HCC)  Optimize diabetes control for hemoglobin A1c below 7%  Orders:    Basic Metabolic Panel; Future    CBC WITHOUT DIFFERENTIAL; Future    MICROALB/CREAT RATIO RAND. UR    Basic Metabolic Panel; Future    CBC WITHOUT DIFFERENTIAL; Future    MICROALB/CREAT RATIO RAND. UR    Hyperkalemia  Patient was advised to be on low potassium diet  Start furosemide 80 mg daily, patient was advised about the potential side effects  Repeat labs in 1 to 2 weeks  Orders:    Basic Metabolic Panel; Future    CBC WITHOUT DIFFERENTIAL; Future    MICROALB/CREAT RATIO RAND. UR    Basic Metabolic Panel; Future    CBC WITHOUT DIFFERENTIAL; Future    MICROALB/CREAT RATIO RAND. UR    Edema, unspecified type  Patient was advised to be low-sodium diet  Start furosemide

## 2025-03-19 ENCOUNTER — TELEPHONE (OUTPATIENT)
Dept: HEALTH INFORMATION MANAGEMENT | Facility: OTHER | Age: 60
End: 2025-03-19
Payer: COMMERCIAL

## 2025-03-24 ENCOUNTER — TELEPHONE (OUTPATIENT)
Dept: HEALTH INFORMATION MANAGEMENT | Facility: OTHER | Age: 60
End: 2025-03-24
Payer: COMMERCIAL

## 2025-04-01 ENCOUNTER — APPOINTMENT (OUTPATIENT)
Dept: NEPHROLOGY | Facility: MEDICAL CENTER | Age: 60
End: 2025-04-01
Payer: COMMERCIAL

## 2025-04-01 VITALS
RESPIRATION RATE: 12 BRPM | HEIGHT: 65 IN | BODY MASS INDEX: 28.26 KG/M2 | SYSTOLIC BLOOD PRESSURE: 92 MMHG | OXYGEN SATURATION: 98 % | HEART RATE: 85 BPM | TEMPERATURE: 97.7 F | DIASTOLIC BLOOD PRESSURE: 42 MMHG | WEIGHT: 169.6 LBS

## 2025-04-01 DIAGNOSIS — I10 PRIMARY HYPERTENSION: ICD-10-CM

## 2025-04-01 DIAGNOSIS — N18.4 STAGE 4 CHRONIC KIDNEY DISEASE (HCC): ICD-10-CM

## 2025-04-01 DIAGNOSIS — Z79.4 TYPE 2 DIABETES MELLITUS WITH STAGE 4 CHRONIC KIDNEY DISEASE, WITH LONG-TERM CURRENT USE OF INSULIN (HCC): ICD-10-CM

## 2025-04-01 DIAGNOSIS — N18.4 TYPE 2 DIABETES MELLITUS WITH STAGE 4 CHRONIC KIDNEY DISEASE, WITH LONG-TERM CURRENT USE OF INSULIN (HCC): ICD-10-CM

## 2025-04-01 DIAGNOSIS — E87.5 HYPERKALEMIA: ICD-10-CM

## 2025-04-01 DIAGNOSIS — E11.22 TYPE 2 DIABETES MELLITUS WITH STAGE 4 CHRONIC KIDNEY DISEASE, WITH LONG-TERM CURRENT USE OF INSULIN (HCC): ICD-10-CM

## 2025-04-01 PROCEDURE — 3078F DIAST BP <80 MM HG: CPT | Performed by: INTERNAL MEDICINE

## 2025-04-01 PROCEDURE — 99214 OFFICE O/P EST MOD 30 MIN: CPT | Performed by: INTERNAL MEDICINE

## 2025-04-01 PROCEDURE — 3074F SYST BP LT 130 MM HG: CPT | Performed by: INTERNAL MEDICINE

## 2025-04-01 PROCEDURE — G2211 COMPLEX E/M VISIT ADD ON: HCPCS | Performed by: INTERNAL MEDICINE

## 2025-04-01 RX ORDER — SODIUM BICARBONATE 650 MG/1
650 TABLET ORAL 2 TIMES DAILY
Qty: 180 TABLET | Refills: 3 | Status: SHIPPED | OUTPATIENT
Start: 2025-04-01 | End: 2026-04-01

## 2025-04-01 ASSESSMENT — ENCOUNTER SYMPTOMS
SHORTNESS OF BREATH: 0
COUGH: 0
FEVER: 0
VOMITING: 0
CHILLS: 0
HYPERTENSION: 1
NAUSEA: 0

## 2025-04-01 ASSESSMENT — FIBROSIS 4 INDEX: FIB4 SCORE: 1.19

## 2025-04-01 NOTE — ASSESSMENT & PLAN NOTE
Blood pressure is on the low side  I advised the patient to discontinue amlodipine  Continue low-sodium diet  Orders:    Basic Metabolic Panel; Future    CBC WITHOUT DIFFERENTIAL; Future    MICROALB/CREAT RATIO RAND. UR

## 2025-04-01 NOTE — PROGRESS NOTES
"Subjective     Juan Almaguer is a 59 y.o. male who presents with Chronic Kidney Disease and Hypertension            Chronic Kidney Disease  This is a chronic problem. The current episode started more than 1 year ago. The problem occurs constantly. The problem has been unchanged. Pertinent negatives include no chest pain, chills, coughing, fever, nausea, urinary symptoms or vomiting.   Hypertension  This is a chronic problem. The current episode started more than 1 year ago. The problem is unchanged. The problem is controlled. Pertinent negatives include no chest pain, malaise/fatigue, peripheral edema or shortness of breath. Risk factors for coronary artery disease include male gender and diabetes mellitus. Past treatments include calcium channel blockers and beta blockers. The current treatment provides significant improvement. There are no compliance problems.  Hypertensive end-organ damage includes kidney disease. Identifiable causes of hypertension include chronic renal disease.       Review of Systems   Constitutional:  Negative for chills, fever and malaise/fatigue.   Respiratory:  Negative for cough and shortness of breath.    Cardiovascular:  Negative for chest pain and leg swelling.   Gastrointestinal:  Negative for nausea and vomiting.   Genitourinary:  Negative for dysuria, frequency and urgency.              Objective     BP 92/42 (BP Location: Right arm, Patient Position: Sitting, BP Cuff Size: Adult)   Pulse 85   Temp 36.5 °C (97.7 °F) (Temporal)   Resp 12   Ht 1.651 m (5' 5\")   Wt 76.9 kg (169 lb 9.6 oz)   SpO2 98%   BMI 28.22 kg/m²      Physical Exam  Vitals and nursing note reviewed.   Constitutional:       General: He is not in acute distress.     Appearance: He is not ill-appearing.   HENT:      Head: Normocephalic and atraumatic.      Right Ear: External ear normal.      Left Ear: External ear normal.      Nose: Nose normal.   Eyes:      General:         Right eye: No discharge.   "       Left eye: No discharge.      Conjunctiva/sclera: Conjunctivae normal.   Cardiovascular:      Rate and Rhythm: Normal rate and regular rhythm.      Heart sounds: No murmur heard.  Pulmonary:      Effort: Pulmonary effort is normal. No respiratory distress.      Breath sounds: Normal breath sounds. No wheezing.   Musculoskeletal:         General: No tenderness or deformity.      Right lower leg: No edema.      Left lower leg: No edema.   Skin:     General: Skin is warm.   Neurological:      General: No focal deficit present.      Mental Status: He is alert and oriented to person, place, and time.   Psychiatric:         Mood and Affect: Mood normal.         Behavior: Behavior normal.       Past Medical History:   Diagnosis Date    Diabetes ([facility])      Social History     Socioeconomic History    Marital status: Single     Spouse name: Not on file    Number of children: Not on file    Years of education: Not on file    Highest education level: Not on file   Occupational History    Not on file   Tobacco Use    Smoking status: Never    Smokeless tobacco: Never   Vaping Use    Vaping status: Never Used   Substance and Sexual Activity    Alcohol use: Not Currently    Drug use: Not Currently    Sexual activity: Not on file   Other Topics Concern    Not on file   Social History Narrative    Not on file     Social Drivers of Health     Financial Resource Strain: Not on file   Food Insecurity: No Food Insecurity (2/23/2025)    Hunger Vital Sign     Worried About Running Out of Food in the Last Year: Never true     Ran Out of Food in the Last Year: Never true   Recent Concern: Food Insecurity - Food Insecurity Present (2/20/2025)    Hunger Vital Sign     Worried About Running Out of Food in the Last Year: Sometimes true     Ran Out of Food in the Last Year: Sometimes true   Transportation Needs: No Transportation Needs (2/23/2025)    PRAPARE - Transportation     Lack of Transportation (Medical): No     Lack of Transportation  (Non-Medical): No   Physical Activity: Not on file   Stress: Not on file   Social Connections: Not on file   Intimate Partner Violence: Not At Risk (2/23/2025)    Humiliation, Afraid, Rape, and Kick questionnaire     Fear of Current or Ex-Partner: No     Emotionally Abused: No     Physically Abused: No     Sexually Abused: No   Housing Stability: High Risk (2/23/2025)    Housing Stability Vital Sign     Unable to Pay for Housing in the Last Year: No     Number of Times Moved in the Last Year: 2     Homeless in the Last Year: Yes     History reviewed. No pertinent family history.  Recent Labs     02/20/25  0834 02/21/25  0106 02/22/25  0559 02/25/25  0601 02/25/25  1804 02/26/25  0618   ALBUMIN 4.2 3.6  --   --   --   --    SODIUM 133* 137   < > 134* 134* 134*   POTASSIUM 5.5 5.8*   < > 4.5 3.9 4.2   CHLORIDE 103 109   < > 95* 96 97   CO2 17* 19*   < > 24 22 24   BUN 85* 77*   < > 61* 65* 62*   CREATININE 3.55* 3.03*   < > 2.82* 3.35* 2.90*   PHOSPHORUS  --   --   --   --   --  4.4    < > = values in this interval not displayed.                                  Assessment & Plan  Stage 4 chronic kidney disease (HCC)  Stable  No uremic symptoms  Renal dose of medication  Avoid nephrotoxins  Continue same medication regimen  Patient was advised to call us if symptoms worsen    Orders:    Basic Metabolic Panel; Future    CBC WITHOUT DIFFERENTIAL; Future    MICROALB/CREAT RATIO RAND. UR    Hyperkalemia  Patient was advised to discontinue electrolyte water supplement  Be on low potassium diet  Recheck labs  Orders:    Basic Metabolic Panel; Future    CBC WITHOUT DIFFERENTIAL; Future    MICROALB/CREAT RATIO RAND. UR    Basic Metabolic Panel; Future    Primary hypertension  Blood pressure is on the low side  I advised the patient to discontinue amlodipine  Continue low-sodium diet  Orders:    Basic Metabolic Panel; Future    CBC WITHOUT DIFFERENTIAL; Future    MICROALB/CREAT RATIO RAND. UR    Type 2 diabetes mellitus with  stage 4 chronic kidney disease, with long-term current use of insulin (HCC)

## 2025-04-14 ENCOUNTER — TELEPHONE (OUTPATIENT)
Dept: NEPHROLOGY | Facility: MEDICAL CENTER | Age: 60
End: 2025-04-14
Payer: COMMERCIAL

## 2025-04-14 NOTE — TELEPHONE ENCOUNTER
Pt called and stated that he was told to call after he did his labs, he stated that he had it done 4/10/25. Results are in media. He also stated that he is currently admitted in the hospital at [facility] for the same issues that he went for last time. He gave no other information.

## 2025-04-17 ENCOUNTER — TELEPHONE (OUTPATIENT)
Dept: NEPHROLOGY | Facility: MEDICAL CENTER | Age: 60
End: 2025-04-17
Payer: COMMERCIAL

## 2025-04-17 NOTE — TELEPHONE ENCOUNTER
Received a call from NP Martha Beach from [facility] she requested patients medication list. I provided the med list to her however she said she was under the impression that this patient was suppose to stop using AmLODIPine 5mg tab she wants to make sure that's the case.     [phone]

## 2025-04-24 ENCOUNTER — TELEPHONE (OUTPATIENT)
Dept: NEPHROLOGY | Facility: MEDICAL CENTER | Age: 60
End: 2025-04-24
Payer: COMMERCIAL

## 2025-04-24 NOTE — TELEPHONE ENCOUNTER
Patient called stated his primary care provider wants to know if he should still be taking his lisinopril medication.     Thank you,   Gabriela MICHEL